# Patient Record
(demographics unavailable — no encounter records)

---

## 2024-10-15 NOTE — REASON FOR VISIT
[Spouse] : spouse [de-identified] : GIOVANNI, Rt VATS, RUL wedge rxn, RUL completion lobectomy, MLND, pneumolysis [de-identified] : 9/27/24

## 2024-10-15 NOTE — REASON FOR VISIT
[Spouse] : spouse [de-identified] : GIOVANNI, Rt VATS, RUL wedge rxn, RUL completion lobectomy, MLND, pneumolysis [de-identified] : 9/27/24

## 2024-10-15 NOTE — ASSESSMENT
[FreeTextEntry1] : Mr. DAVID NICK, 61 year old male, former smoker, w/ hx of newly dx'ed metastatic rectal cancer with rectosigmoid perforation s/p open Curry's procedure, now on chemo (start date 3/11/2023) who presented for abnormal CT Chest. Referred by Dr. Ramon.  CT Chest on 2/8/24: - 2.6 cm mixed ggo and solid nodule in the RUL containing dilated airways - stable small to moderate Lt pleural effusion with subadjacent atelectasis - resolved right pleural effusion - lesions in the right hepatic lobe has increased in size  Started chemo on 3/11/2023 every 2 weeks for 6 cycles with Dr. Mcdonald- stopped chemo after 1 cycle for Castro Bronch. Resumed 2nd cycle of chemo on 7/8/2024  MRI of the head on 3/20/2024: - A 2 mm enhancing right posterior parafalcine extra-axial lesion (16:98, 17:156).  PET on 4/17/2024: - Tunneled right chest port with tip in SVC - 3.6 x 2.2 cm stable mixed attenuation RIGHT UPPER lobe pulmonary lesion (SUV 1.2, image 94), with mild uptake - Intensely increased uptake at the RIGHT lung base without clear low-dose CT correlate (finding has SUV 6.6, image 139, SUV 6.6, image 141) placement. - There are some similar uptake is seen at the posterolateral RIGHT lung base (SUV 7.0, image 147). - Small LEFT pleural effusion with mild uptake. - centrally photopenic, peripherally hypermetabolic inferior RIGHT lobe liver mass (4.0 x 2.8 cm, SUV 10.7, image 169) - Suspected LEFT retroperitoneal node not well-defined on low-dose CT (SUV 3.8, image 208). - Small LEFT pelvic sidewall node (0.5 x 0.4 cm, SUV 6.2, image 237). - Small peritoneal nodule at anterior LEFT hemiabdomen (0.7 x 0.5 cm, SUV 2.8, image 185). - There is intense uptake at a perihepatic ascites adjacent to the inferior RIGHT lobe (SUV 13.5, image 161). - There is an additional peritoneal implant suspected at the LEFT paracolic region (SUV 8.4, image 131). - Increased uptake is seen at the anterior midline of the pelvis (SUV 34.3, image 242).  Now s/p navigational broncho with Dr. Jaquez on 6/11/24. Path right upper lung FORCEPS/CRYO BIOPSY AND ROBOTIC ASSISTED FNA revealed NON-DIAGNOSTIC. Path of Lymph node level 7, LN 11RS is negative for carcinoma, no epithelial lesion identified. Path of right upper lung BAL negative for malignant cells.  PHQ2 completed on 5/21/24.  **Discussed repeat CT chest in August and return to clinic to review results and discuss next step. Plan to proceed with IR marking RUL wedge resection, possible lobectomy if lung nodule does not decrease in size.  CT Chest on 8/13/24: - there are new subpleural patchy groundglass opacities, measuring up to 1.2 cm, in posterior segment of right upper lobe and apicoposterior segment of left upper lobe (series 4, image 173) - there is redemonstration of a 3.4 x 2.3 cm right upper lobe lesion with associated cluster of small cysts and dilated peripheral airways, similar in size, but with increased density. - there is redemonstration of reticulations and subpleural small consolidation in left lower lobe, which may represent scarring/round atelectasis - few stable micronodules are noted (for instance 2 mm micronodule in right lower lobe on series 4, image 782). There is a new branching opacity in left upper lobe (series 4, image 324), likely impacted airway. - there are multiple low-attenuation cystic lesions in the liver parenchyma with the largest on measuring up to 2.7 cm, unchanged  PFTs on 8/21/24: FVC 68%, FEV1 81%, DLCO 67%  Now s/p R.A., Rt VATS, RUL wedge rxn, RUL completion lobectomy, MLND, pneumolysis on 9/27/24. Path revealed invasive acinar AdenoCA, 2.7 cm, G2, all margins and LNs are negative, [id number], Stg IA3  CXR today---- reviewed. Pt reports well, denies SOB, cough or CP.   I have reviewed the patient's medical records and diagnostic images at time of this office consultation and have made the following recommendation: 1.Path reviewed with pt, early-stage lung cancer, RTC in 3 mons with CT Chest w/o contrast. Discussed the risk of recurrence of the disease and stressed the importance of routine CT scan surveillance, Pt verbalized understanding.   I, Dr. DELACRUZ, MEME COVARRUBIAS, personally performed the evaluation and management (E/M) services for this established patient who presents today with (a) new problem(s)/exacerbation of (an) existing condition(s).  That E/M includes conducting the examination, assessing all new/exacerbated conditions, and establishing a new plan of care.  Today, my ACP, Araceli Madera, ELISP-BC was here to observe my evaluation and management services for this new problem/exacerbated condition to be followed going forward.

## 2024-10-15 NOTE — CONSULT LETTER
[Dear  ___] : Dear  [unfilled], [Consult Letter:] : I had the pleasure of evaluating your patient, [unfilled]. [Please see my note below.] : Please see my note below. [Consult Closing:] : Thank you very much for allowing me to participate in the care of this patient.  If you have any questions, please do not hesitate to contact me. [Sincerely,] : Sincerely, [FreeTextEntry2] : Usman Ramon MD (Colon/Rec/Onc) [FreeTextEntry3] : Norma Ram MD Attending Surgeon Division of Thoracic Surgery , [facility] [employer] at [facility]

## 2024-10-15 NOTE — ASSESSMENT
[FreeTextEntry1] : Mr. DAVID NICK, 61 year old male, former smoker, w/ hx of newly dx'ed metastatic rectal cancer with rectosigmoid perforation s/p open Curry's procedure, now on chemo (start date 3/11/2023) who presented for abnormal CT Chest. Referred by Dr. Ramon.  CT Chest on 2/8/24: - 2.6 cm mixed ggo and solid nodule in the RUL containing dilated airways - stable small to moderate Lt pleural effusion with subadjacent atelectasis - resolved right pleural effusion - lesions in the right hepatic lobe has increased in size  Started chemo on 3/11/2023 every 2 weeks for 6 cycles with Dr. Mcdonald- stopped chemo after 1 cycle for Castro Bronch. Resumed 2nd cycle of chemo on 7/8/2024  MRI of the head on 3/20/2024: - A 2 mm enhancing right posterior parafalcine extra-axial lesion (16:98, 17:156).  PET on 4/17/2024: - Tunneled right chest port with tip in SVC - 3.6 x 2.2 cm stable mixed attenuation RIGHT UPPER lobe pulmonary lesion (SUV 1.2, image 94), with mild uptake - Intensely increased uptake at the RIGHT lung base without clear low-dose CT correlate (finding has SUV 6.6, image 139, SUV 6.6, image 141) placement. - There are some similar uptake is seen at the posterolateral RIGHT lung base (SUV 7.0, image 147). - Small LEFT pleural effusion with mild uptake. - centrally photopenic, peripherally hypermetabolic inferior RIGHT lobe liver mass (4.0 x 2.8 cm, SUV 10.7, image 169) - Suspected LEFT retroperitoneal node not well-defined on low-dose CT (SUV 3.8, image 208). - Small LEFT pelvic sidewall node (0.5 x 0.4 cm, SUV 6.2, image 237). - Small peritoneal nodule at anterior LEFT hemiabdomen (0.7 x 0.5 cm, SUV 2.8, image 185). - There is intense uptake at a perihepatic ascites adjacent to the inferior RIGHT lobe (SUV 13.5, image 161). - There is an additional peritoneal implant suspected at the LEFT paracolic region (SUV 8.4, image 131). - Increased uptake is seen at the anterior midline of the pelvis (SUV 34.3, image 242).  Now s/p navigational broncho with Dr. Jaquez on 6/11/24. Path right upper lung FORCEPS/CRYO BIOPSY AND ROBOTIC ASSISTED FNA revealed NON-DIAGNOSTIC. Path of Lymph node level 7, LN 11RS is negative for carcinoma, no epithelial lesion identified. Path of right upper lung BAL negative for malignant cells.  PHQ2 completed on 5/21/24.  **Discussed repeat CT chest in August and return to clinic to review results and discuss next step. Plan to proceed with IR marking RUL wedge resection, possible lobectomy if lung nodule does not decrease in size.  CT Chest on 8/13/24: - there are new subpleural patchy groundglass opacities, measuring up to 1.2 cm, in posterior segment of right upper lobe and apicoposterior segment of left upper lobe (series 4, image 173) - there is redemonstration of a 3.4 x 2.3 cm right upper lobe lesion with associated cluster of small cysts and dilated peripheral airways, similar in size, but with increased density. - there is redemonstration of reticulations and subpleural small consolidation in left lower lobe, which may represent scarring/round atelectasis - few stable micronodules are noted (for instance 2 mm micronodule in right lower lobe on series 4, image 782). There is a new branching opacity in left upper lobe (series 4, image 324), likely impacted airway. - there are multiple low-attenuation cystic lesions in the liver parenchyma with the largest on measuring up to 2.7 cm, unchanged  PFTs on 8/21/24: FVC 68%, FEV1 81%, DLCO 67%  Now s/p R.A., Rt VATS, RUL wedge rxn, RUL completion lobectomy, MLND, pneumolysis on 9/27/24. Path revealed invasive acinar AdenoCA, 2.7 cm, G2, all margins and LNs are negative, [id number], Stg IA3  CXR today---- reviewed. Pt reports well, denies SOB, cough or CP.   I have reviewed the patient's medical records and diagnostic images at time of this office consultation and have made the following recommendation: 1.Path reviewed with pt, early-stage lung cancer, RTC in 3 mons with CT Chest w/o contrast. Discussed the risk of recurrence of the disease and stressed the importance of routine CT scan surveillance, Pt verbalized understanding.   I, Dr. DELACRUZ, MEME COVARRUBIAS, personally performed the evaluation and management (E/M) services for this established patient who presents today with (a) new problem(s)/exacerbation of (an) existing condition(s).  That E/M includes conducting the examination, assessing all new/exacerbated conditions, and establishing a new plan of care.  Today, my ACP, Araceli Madera, ELISP-BC was here to observe my evaluation and management services for this new problem/exacerbated condition to be followed going forward.

## 2024-10-15 NOTE — CONSULT LETTER
[Dear  ___] : Dear  [unfilled], [Consult Letter:] : I had the pleasure of evaluating your patient, [unfilled]. [Please see my note below.] : Please see my note below. [Consult Closing:] : Thank you very much for allowing me to participate in the care of this patient.  If you have any questions, please do not hesitate to contact me. [Sincerely,] : Sincerely, [FreeTextEntry2] : Usman Ramon MD (Colon/Rec/Onc) [FreeTextEntry3] : Norma Ram MD Attending Surgeon Division of Thoracic Surgery , [facility] [employer] at [facility]

## 2024-10-22 NOTE — CONSULT LETTER
[Dear  ___] : Dear  [unfilled], [Courtesy Letter:] : I had the pleasure of seeing your patient, [unfilled], in my office today. [Please see my note below.] : Please see my note below. [Consult Closing:] : Thank you very much for allowing me to participate in the care of this patient.  If you have any questions, please do not hesitate to contact me. [Sincerely,] : Sincerely, [DrGasper  ___] : Dr. CATHERINE [DrGasper ___] : Dr. CATHERINE [FreeTextEntry3] : Yuan Dent MD, RYAN, FACS Director of Surgical Oncology- [facility] , Department of Surgery [facility] at [place] [id number] Ph: [phone] Cell: [phone]

## 2024-10-22 NOTE — ASSESSMENT
[FreeTextEntry1] : Mr. 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 Final pathology [id number] LNs 1/17 positive LNs, negative PNI, positive LVI, negative margins. BRAF and MMR intact.   s/p Rt VATS, RUL wedge rxn, RUL completion lobectomy, MLND, pneumolysis 09/27/2024, Path-Adenocarcinoma, acinar type predominant. Last Avastin 8/19/2024 with Dr. Mcdonald.   PLAN:  - MRI MRCP= liver protocol with contrast to define the tumor burden in the liver to consider potential liver resection vs liver directed therapy. - PET scan will be ordered by oncologist Dr. Martins - RTO 2 weeks.   I have discussed the diagnosis, therapeutic plan and options with the patient at length. Patient expressed verbal understanding to proceed with proposed plan. All questions answered.

## 2024-10-22 NOTE — PHYSICAL EXAM
[Normal] : supple, no neck mass and thyroid not enlarged [Normal Neck Lymph Nodes] : normal neck lymph nodes  [Normal Supraclavicular Lymph Nodes] : normal supraclavicular lymph nodes [Normal Groin Lymph Nodes] : normal groin lymph nodes [Normal Axillary Lymph Nodes] : normal axillary lymph nodes [Normal] : oriented to person, place and time, with appropriate affect [FreeTextEntry1] :  COVID -19 precautions as per [facility] policy was universally followed. [de-identified] : Abdomen soft, non-tender, non-distended, and no palpable masses.

## 2024-10-22 NOTE — PHYSICAL EXAM
[Normal] : supple, no neck mass and thyroid not enlarged [Normal Neck Lymph Nodes] : normal neck lymph nodes  [Normal Supraclavicular Lymph Nodes] : normal supraclavicular lymph nodes [Normal Groin Lymph Nodes] : normal groin lymph nodes [Normal Axillary Lymph Nodes] : normal axillary lymph nodes [Normal] : oriented to person, place and time, with appropriate affect [FreeTextEntry1] :  COVID -19 precautions as per [facility] policy was universally followed. [de-identified] : Abdomen soft, non-tender, non-distended, and no palpable masses.

## 2024-10-22 NOTE — HISTORY OF PRESENT ILLNESS
[de-identified] : Mr. 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 Final pathology [id number] LNs  positive LNs, negative PNI, positive LVI, negative margins. BRAF and MMR intact. Ostomy function intact. Currently receiving chemo 3/6 cycle.  Referred by Dr. Ramon   3/29/24 CTA LIVER: Ill-defined hypoattenuating lesion measuring 3.8 x 2.3 cm in the right lobe and a more posterior lesion along the surface of the right hepatic lobe measuring approximately 4.1 x 1.1 cm.  24: Patient reports feeling well today. Denies fever, chills, N/V/D, unintentional weight loss. Ostomy intact. Has hard stools but on stool softener now. Currently on 3/6 cycle (3month course) chemo. Reports IR drain was removed ~3weeks ago.   PET/CT  Multiple foci of intensely increased uptake at the lung bases without clear low-dose CT correlate, suspicious for malignant involvement. Suspected LEFT retroperitoneal and LEFT pelvic sidewall nodes, peritoneal nodule LEFT hemiabdomen, and suspected peritoneal implant LEFT paracolic region, suspicious for metastatic disease. Small LEFT pleural effusion with mild uptake, indeterminate for malignancy.  24: Patient reports hold on chemo to complete Lung bx. Due to restart chemotherapy in July. No new complaints. Denies fever, chills, N/V/D, unintentional weight loss.  s/p Rt VATS, RUL wedge rxn, RUL completion lobectomy, MLND, pneumolysis 2024, Path-Adenocarcinoma, acinar type predominant. Last Avastin 2024 with Dr. Mcdonald. 10/22/2024- Pt reports feeling well. Tolerating diet, has gain a couple of pounds. Denies nausea, vomiting, diarrhea, hematochezia or steatorrhea, and unintentional weight loss at this time. Ostomy site is pink and functioning with brown stool. Wife concerned patient has been having 2 beers daily.   PMHX: none PSH: Curry's 2023 Social: Quit all substance usage but prior to sx in : 1 pack cigs/day; bottle of wine per day, no illicit drug use Med: Senna FHx: unsure   ECO  Unemployed   PCP: in search for one  Med/onc: Dr. Martins Thorac: Dr. Ram - lung lesion

## 2024-10-22 NOTE — REASON FOR VISIT
[Follow-Up Visit] : a follow-up visit for [FreeTextEntry2] : Liver Mass + Ostomy resection [Rectal Cancer] : rectal cancer

## 2024-10-22 NOTE — CONSULT LETTER
[Dear  ___] : Dear  [unfilled], [Courtesy Letter:] : I had the pleasure of seeing your patient, [unfilled], in my office today. [Please see my note below.] : Please see my note below. [Consult Closing:] : Thank you very much for allowing me to participate in the care of this patient.  If you have any questions, please do not hesitate to contact me. [Sincerely,] : Sincerely, [DrGasper  ___] : Dr. CATHERINE [DrGasper ___] : Dr. CATHERINE [FreeTextEntry3] : Yuan Dent MD, RYAN, FACS Director of Surgical Oncology- [facility] , Department of Surgery [facility] at [place] [id number] Ph: [phone] Cell: [phone]

## 2024-10-22 NOTE — HISTORY OF PRESENT ILLNESS
[de-identified] : Mr. 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 Final pathology [id number] LNs  positive LNs, negative PNI, positive LVI, negative margins. BRAF and MMR intact. Ostomy function intact. Currently receiving chemo 3/6 cycle.  Referred by Dr. Ramon   3/29/24 CTA LIVER: Ill-defined hypoattenuating lesion measuring 3.8 x 2.3 cm in the right lobe and a more posterior lesion along the surface of the right hepatic lobe measuring approximately 4.1 x 1.1 cm.  24: Patient reports feeling well today. Denies fever, chills, N/V/D, unintentional weight loss. Ostomy intact. Has hard stools but on stool softener now. Currently on 3/6 cycle (3month course) chemo. Reports IR drain was removed ~3weeks ago.   PET/CT  Multiple foci of intensely increased uptake at the lung bases without clear low-dose CT correlate, suspicious for malignant involvement. Suspected LEFT retroperitoneal and LEFT pelvic sidewall nodes, peritoneal nodule LEFT hemiabdomen, and suspected peritoneal implant LEFT paracolic region, suspicious for metastatic disease. Small LEFT pleural effusion with mild uptake, indeterminate for malignancy.  24: Patient reports hold on chemo to complete Lung bx. Due to restart chemotherapy in July. No new complaints. Denies fever, chills, N/V/D, unintentional weight loss.  s/p Rt VATS, RUL wedge rxn, RUL completion lobectomy, MLND, pneumolysis 2024, Path-Adenocarcinoma, acinar type predominant. Last Avastin 2024 with Dr. Mcdonald. 10/22/2024- Pt reports feeling well. Tolerating diet, has gain a couple of pounds. Denies nausea, vomiting, diarrhea, hematochezia or steatorrhea, and unintentional weight loss at this time. Ostomy site is pink and functioning with brown stool. Wife concerned patient has been having 2 beers daily.   PMHX: none PSH: Curry's 2023 Social: Quit all substance usage but prior to sx in : 1 pack cigs/day; bottle of wine per day, no illicit drug use Med: Senna FHx: unsure   [employer]  Unemployed   PCP: in search for one  Med/onc: Dr. Martins Thorac: Dr. Ram - lung lesion

## 2024-10-22 NOTE — ASSESSMENT
[FreeTextEntry1] : Mr. 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 Final pathology [id number] LNs 1/17 positive LNs, negative PNI, positive LVI, negative margins. BRAF and MMR intact.   s/p Rt VATS, RUL wedge rxn, RUL completion lobectomy, MLND, pneumolysis 09/27/2024, Path-Adenocarcinoma, acinar type predominant. Last Avastin 8/19/2024 with Dr. Mcdonald.   PLAN:  - MRI MRCP= liver protocol with contrast to define the tumor burden in the liver to consider potential liver resection vs liver directed therapy. - PET scan will be ordered by oncologist Dr. Martins - RTO 2 weeks.   I have discussed the diagnosis, therapeutic plan and options with the patient at length. Patient expressed verbal understanding to proceed with proposed plan. All questions answered.

## 2024-11-06 NOTE — CONSULT LETTER
[Dear  ___] : Dear  [unfilled], [Courtesy Letter:] : I had the pleasure of seeing your patient, [unfilled], in my office today. [Please see my note below.] : Please see my note below. [Consult Closing:] : Thank you very much for allowing me to participate in the care of this patient.  If you have any questions, please do not hesitate to contact me. [Sincerely,] : Sincerely, [DrGasper  ___] : Dr. CATHERINE [DrGasper ___] : Dr. CATHERINE [FreeTextEntry3] : Yuan Dent MD, RYAN, FACS Director of Surgical Oncology- [facility] , Department of Surgery [facility] at [place] [id number] Ph: [phone] Cell: [phone]

## 2024-11-06 NOTE — HISTORY OF PRESENT ILLNESS
[de-identified] : Mr. 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 Final pathology [id number] LNs  positive LNs, negative PNI, positive LVI, negative margins. BRAF and MMR intact. Ostomy function intact. Currently receiving chemo 3/6 cycle.  Referred by Dr. Ramon   3/29/24 CTA LIVER: Ill-defined hypoattenuating lesion measuring 3.8 x 2.3 cm in the right lobe and a more posterior lesion along the surface of the right hepatic lobe measuring approximately 4.1 x 1.1 cm.  24: Patient reports feeling well today. Denies fever, chills, N/V/D, unintentional weight loss. Ostomy intact. Has hard stools but on stool softener now. Currently on 3/6 cycle (3month course) chemo. Reports IR drain was removed ~3weeks ago.   PET/CT  Multiple foci of intensely increased uptake at the lung bases without clear low-dose CT correlate, suspicious for malignant involvement. Suspected LEFT retroperitoneal and LEFT pelvic sidewall nodes, peritoneal nodule LEFT hemiabdomen, and suspected peritoneal implant LEFT paracolic region, suspicious for metastatic disease. Small LEFT pleural effusion with mild uptake, indeterminate for malignancy.  24: Patient reports hold on chemo to complete Lung bx. Due to restart chemotherapy in July. No new complaints. Denies fever, chills, N/V/D, unintentional weight loss.  s/p Rt VATS, RUL wedge rxn, RUL completion lobectomy, MLND, pneumolysis 2024, Path-Adenocarcinoma, acinar type predominant. Last Avastin 2024 with Dr. Mcdonald. 10/22/2024- Pt reports feeling well. Tolerating diet, has gain a couple of pounds. Denies nausea, vomiting, diarrhea, hematochezia or steatorrhea, and unintentional weight loss at this time. Ostomy site is pink and functioning with brown stool. Wife concerned patient has been having 2 beers daily.   10/30/2024 MR Abd- 1. There is a 3.8 cm metastasis in segment VI of the liver with probable extracapsular extension. There are a few additional subcapsular liver masses that probably represent metastases. 2. Interval right upper lobectomy. Signal abnormality and abnormal enhancement anterolateral aspect of right lower chest wall could represent postoperative change, but correlation with surgical approach recommended in order to rule out chest wall metastases. 3. There are new nodular opacities in both lungs.   2024 PET-PET-2024  1. Non-avid postsurgical changes in the right upper lobe. New ground glass opacities in both lung fields as detailed above with mild activity since most recent CT in 2024. These may represent inflammatory process; however, malignant process is not excluded. Follow-up CT chest in 2-4 weeks recommended.  2. Smaller extent of increased activity in the posterior sulcus of the right lower lobe and less extensive pleuro parenchymal and atelectatic changes in the left lower lobe with improvement in extent and FDG activity. These findings suggest partial response to recent systemic therapy.  3. FDG avid hypodense lesion in hepatic segment 6 with photopenic necrotic component and peripheral rind of intense activity compatible with persistent metastatic process; likely improvement of other subcapsular lesions in segment 7 and segment 6/7. Findings may reflect partial response to therapy.  4. Interval resolution of foci of increased activity in the left anterior abdominal wall, left paracolic gutter, left retroperitoneal and left pelvic sidewall.   2024- Pt reports feeling well, has gained 1 lbs. Stoma site looks viable with formed stool. Denies etoh use in the past month.   PMHX: none PSH: Curry's 2023 Social: Quit all substance usage but prior to sx in : 1 pack cigs/day; bottle of wine per day, no illicit drug use Med: Senna FHx: unsure   [employer]  Unemployed   PCP: in search for one  Med/onc: Dr. Martins Thorac: Dr. Ram - lung lesion

## 2024-11-06 NOTE — PHYSICAL EXAM
[Normal] : supple, no neck mass and thyroid not enlarged [Normal Neck Lymph Nodes] : normal neck lymph nodes  [Normal Supraclavicular Lymph Nodes] : normal supraclavicular lymph nodes [Normal Groin Lymph Nodes] : normal groin lymph nodes [Normal Axillary Lymph Nodes] : normal axillary lymph nodes [Normal] : oriented to person, place and time, with appropriate affect [FreeTextEntry1] :  COVID -19 precautions as per [facility] policy was universally followed. [de-identified] : Abdomen soft, non-tender, non-distended, and no palpable masses.

## 2024-11-06 NOTE — ASSESSMENT
[FreeTextEntry1] : Mr. 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 Final pathology [id number] LNs 1/17 positive LNs, negative PNI, positive LVI, negative margins. BRAF and MMR intact.   s/p Rt VATS, RUL wedge rxn, RUL completion lobectomy, MLND, pneumolysis 09/27/2024, Path-Adenocarcinoma, acinar type predominant. Last Avastin 8/19/2024 with Dr. Mcdonald.  10/30/2024 MR Abd- 1. There is a 3.8 cm metastasis in segment VI of the liver with probable extracapsular extension. There are a few additional subcapsular liver masses that probably represent metastases. 2. Interval right upper lobectomy. Signal abnormality and abnormal enhancement anterolateral aspect of right lower chest wall could represent postoperative change, but correlation with surgical approach recommended in order to rule out chest wall metastases. 3. There are new nodular opacities in both lungs.   11/01/2024 PET-PET-11/01/2024  1. Non-avid postsurgical changes in the right upper lobe. New ground glass opacities in both lung fields as detailed above with mild activity since most recent CT in 8/2024. These may represent inflammatory process; however, malignant process is not excluded. Follow-up CT chest in 2-4 weeks recommended.  2. Smaller extent of increased activity in the posterior sulcus of the right lower lobe and less extensive pleuro parenchymal and atelectatic changes in the left lower lobe with improvement in extent and FDG activity. These findings suggest partial response to recent systemic therapy.  3. FDG avid hypodense lesion in hepatic segment 6 with photopenic necrotic component and peripheral rind of intense activity compatible with persistent metastatic process; likely improvement of other subcapsular lesions in segment 7 and segment 6/7. Findings may reflect partial response to therapy.  4. Interval resolution of foci of increased activity in the left anterior abdominal wall, left paracolic gutter, left retroperitoneal and left pelvic sidewall.   PLAN:  -[facility] Consensus repeat CT chest in 2-4 weeks to r/o findings in PET, will be order by medical oncology. -Restart chemotherapy with Dr. Mcdonald as patient has responded CEA has decreased and PET has demonstrated tumor response despite interruptions of chemo.   I have discussed the diagnosis, therapeutic plan and options with the patient at length. Patient expressed verbal understanding to proceed with proposed plan. All questions answered.

## 2024-11-06 NOTE — CONSULT LETTER
[Dear  ___] : Dear  [unfilled], [Courtesy Letter:] : I had the pleasure of seeing your patient, [unfilled], in my office today. [Please see my note below.] : Please see my note below. [Consult Closing:] : Thank you very much for allowing me to participate in the care of this patient.  If you have any questions, please do not hesitate to contact me. [Sincerely,] : Sincerely, [DrGasper  ___] : Dr. CATHERINE [DrGasper ___] : Dr. CATHERINE [FreeTextEntry3] : Yuan Dent MD, RYAN, FACS Director of Surgical Oncology- [facility] , Department of Surgery [facility] at [place] [id number] Ph: [phone] Cell: [phone]

## 2024-11-06 NOTE — HISTORY OF PRESENT ILLNESS
[de-identified] : Mr. 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 Final pathology [id number] LNs  positive LNs, negative PNI, positive LVI, negative margins. BRAF and MMR intact. Ostomy function intact. Currently receiving chemo 3/6 cycle.  Referred by Dr. Ramon   3/29/24 CTA LIVER: Ill-defined hypoattenuating lesion measuring 3.8 x 2.3 cm in the right lobe and a more posterior lesion along the surface of the right hepatic lobe measuring approximately 4.1 x 1.1 cm.  24: Patient reports feeling well today. Denies fever, chills, N/V/D, unintentional weight loss. Ostomy intact. Has hard stools but on stool softener now. Currently on 3/6 cycle (3month course) chemo. Reports IR drain was removed ~3weeks ago.   PET/CT  Multiple foci of intensely increased uptake at the lung bases without clear low-dose CT correlate, suspicious for malignant involvement. Suspected LEFT retroperitoneal and LEFT pelvic sidewall nodes, peritoneal nodule LEFT hemiabdomen, and suspected peritoneal implant LEFT paracolic region, suspicious for metastatic disease. Small LEFT pleural effusion with mild uptake, indeterminate for malignancy.  24: Patient reports hold on chemo to complete Lung bx. Due to restart chemotherapy in July. No new complaints. Denies fever, chills, N/V/D, unintentional weight loss.  s/p Rt VATS, RUL wedge rxn, RUL completion lobectomy, MLND, pneumolysis 2024, Path-Adenocarcinoma, acinar type predominant. Last Avastin 2024 with Dr. Mcdonald. 10/22/2024- Pt reports feeling well. Tolerating diet, has gain a couple of pounds. Denies nausea, vomiting, diarrhea, hematochezia or steatorrhea, and unintentional weight loss at this time. Ostomy site is pink and functioning with brown stool. Wife concerned patient has been having 2 beers daily.   10/30/2024 MR Abd- 1. There is a 3.8 cm metastasis in segment VI of the liver with probable extracapsular extension. There are a few additional subcapsular liver masses that probably represent metastases. 2. Interval right upper lobectomy. Signal abnormality and abnormal enhancement anterolateral aspect of right lower chest wall could represent postoperative change, but correlation with surgical approach recommended in order to rule out chest wall metastases. 3. There are new nodular opacities in both lungs.   2024 PET-PET-2024  1. Non-avid postsurgical changes in the right upper lobe. New ground glass opacities in both lung fields as detailed above with mild activity since most recent CT in 2024. These may represent inflammatory process; however, malignant process is not excluded. Follow-up CT chest in 2-4 weeks recommended.  2. Smaller extent of increased activity in the posterior sulcus of the right lower lobe and less extensive pleuro parenchymal and atelectatic changes in the left lower lobe with improvement in extent and FDG activity. These findings suggest partial response to recent systemic therapy.  3. FDG avid hypodense lesion in hepatic segment 6 with photopenic necrotic component and peripheral rind of intense activity compatible with persistent metastatic process; likely improvement of other subcapsular lesions in segment 7 and segment 6/7. Findings may reflect partial response to therapy.  4. Interval resolution of foci of increased activity in the left anterior abdominal wall, left paracolic gutter, left retroperitoneal and left pelvic sidewall.   2024- Pt reports feeling well, has gained 1 lbs. Stoma site looks viable with formed stool. Denies etoh use in the past month.   PMHX: none PSH: Curry's 2023 Social: Quit all substance usage but prior to sx in : 1 pack cigs/day; bottle of wine per day, no illicit drug use Med: Senna FHx: unsure   [employer]  Unemployed   PCP: in search for one  Med/onc: Dr. Martins Thorac: Dr. Ram - lung lesion

## 2024-11-06 NOTE — PHYSICAL EXAM
[Normal] : supple, no neck mass and thyroid not enlarged [Normal Neck Lymph Nodes] : normal neck lymph nodes  [Normal Supraclavicular Lymph Nodes] : normal supraclavicular lymph nodes [Normal Groin Lymph Nodes] : normal groin lymph nodes [Normal Axillary Lymph Nodes] : normal axillary lymph nodes [Normal] : oriented to person, place and time, with appropriate affect [FreeTextEntry1] :  COVID -19 precautions as per [facility] policy was universally followed. [de-identified] : Abdomen soft, non-tender, non-distended, and no palpable masses.

## 2024-11-06 NOTE — ASSESSMENT
[FreeTextEntry1] : Mr. 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 Final pathology [id number] LNs 1/17 positive LNs, negative PNI, positive LVI, negative margins. BRAF and MMR intact.   s/p Rt VATS, RUL wedge rxn, RUL completion lobectomy, MLND, pneumolysis 09/27/2024, Path-Adenocarcinoma, acinar type predominant. Last Avastin 8/19/2024 with Dr. Mcdonald.  10/30/2024 MR Abd- 1. There is a 3.8 cm metastasis in segment VI of the liver with probable extracapsular extension. There are a few additional subcapsular liver masses that probably represent metastases. 2. Interval right upper lobectomy. Signal abnormality and abnormal enhancement anterolateral aspect of right lower chest wall could represent postoperative change, but correlation with surgical approach recommended in order to rule out chest wall metastases. 3. There are new nodular opacities in both lungs.   11/01/2024 PET-PET-11/01/2024  1. Non-avid postsurgical changes in the right upper lobe. New ground glass opacities in both lung fields as detailed above with mild activity since most recent CT in 8/2024. These may represent inflammatory process; however, malignant process is not excluded. Follow-up CT chest in 2-4 weeks recommended.  2. Smaller extent of increased activity in the posterior sulcus of the right lower lobe and less extensive pleuro parenchymal and atelectatic changes in the left lower lobe with improvement in extent and FDG activity. These findings suggest partial response to recent systemic therapy.  3. FDG avid hypodense lesion in hepatic segment 6 with photopenic necrotic component and peripheral rind of intense activity compatible with persistent metastatic process; likely improvement of other subcapsular lesions in segment 7 and segment 6/7. Findings may reflect partial response to therapy.  4. Interval resolution of foci of increased activity in the left anterior abdominal wall, left paracolic gutter, left retroperitoneal and left pelvic sidewall.   PLAN:  -[facility] Consensus repeat CT chest in 2-4 weeks to r/o findings in PET, will be order by medical oncology. -Restart chemotherapy with Dr. Mcdonald as patient has responded CEA has decreased and PET has demonstrated tumor response despite interruptions of chemo.   I have discussed the diagnosis, therapeutic plan and options with the patient at length. Patient expressed verbal understanding to proceed with proposed plan. All questions answered.

## 2024-11-27 NOTE — HISTORY OF PRESENT ILLNESS
[de-identified] : 61 year old male, former smoker, w/ hx of newly dx'ed metastatic rectal cancer with rectosigmoid perforation s/p open Curry's procedure, now on chemotherapy presenting for a follow up visit. BP noted to be elevated today. Pt states it is also elevated at home. He feels well overall and denies low appetite, fatigue, SOB, or CP. He is compliant with his BP medication.

## 2024-11-27 NOTE — HEALTH RISK ASSESSMENT
[Intercurrent hospitalizations] : was admitted to the hospital  [de-identified] : 09/27 anna [de-identified] : Oncologist [de-identified] : pt is nto active [de-identified] : healthy

## 2024-12-11 NOTE — HEALTH RISK ASSESSMENT
[No] : No [No falls in past year] : Patient reported no falls in the past year [de-identified] : No [de-identified] : No [de-identified] : No [de-identified] : No

## 2024-12-11 NOTE — HISTORY OF PRESENT ILLNESS
[FreeTextEntry1] : Pt is here to follow up for B/P after changing dose on medication. [de-identified] : 61 M former smoker with history of newly diagnosed metastatic rectal cancer with rectosigmoid perforation s/p open Curry's procedure now on chemotherapy presenting for a follow up visit. Home BP logs have been reviewed with the patient. Normal BP logs. Patient last went for chemotherapy on 12/3/2024. He feels well.

## 2025-01-02 NOTE — ASSESSMENT
[FreeTextEntry1] : Mr. DAVID NICK, 61 year old male, former smoker, w/ hx of newly dx'ed metastatic rectal cancer with rectosigmoid perforation s/p open Curry's procedure, now on chemo (start date 3/11/2023) who presented for abnormal CT Chest. Referred by Dr. Ramon.  Now s/p R.A., Rt VATS, RUL wedge rxn, RUL completion lobectomy, MLND, pneumolysis on 9/27/24. Path revealed invasive acinar AdenoCA, 2.7 cm, G2, all margins and LNs are negative, [id number], Stg IA3  Presents today for follow up with imaging.   I have reviewed the patient's medical records and diagnostic images at time of this office consultation and have made the following recommendation: 1.  Recommendations reviewed with patient during this office visit, and all questions answered; Patient instructed on the importance of follow up and verbalizes understanding.

## 2025-01-02 NOTE — CONSULT LETTER
[Dear  ___] : Dear  [unfilled], [Consult Letter:] : I had the pleasure of evaluating your patient, [unfilled]. [Please see my note below.] : Please see my note below. [Consult Closing:] : Thank you very much for allowing me to participate in the care of this patient.  If you have any questions, please do not hesitate to contact me. [Sincerely,] : Sincerely, [FreeTextEntry2] : Usman Ramon MD (Colon/Rec/Onc) [FreeTextEntry3] : Norma Ram MD Attending Surgeon Division of Thoracic Surgery , [facility] [employer] at [facility]

## 2025-01-02 NOTE — HISTORY OF PRESENT ILLNESS
[FreeTextEntry1] : Mr. DAVID NICK, 61 year old male, former smoker, w/ hx of newly dx'ed metastatic rectal cancer with rectosigmoid perforation s/p open Curry's procedure, now on chemo (start date 3/11/2023) who presented for abnormal CT Chest. Referred by Dr. Ramon.  CT Chest on 2/8/24: - 2.6 cm mixed ggo and solid nodule in the RUL containing dilated airways - stable small to moderate Lt pleural effusion with subadjacent atelectasis - resolved right pleural effusion - lesions in the right hepatic lobe has increased in size  Started chemo on 3/11/2023 every 2 weeks for 6 cycles with Dr. Mcdonald- stopped chemo after 1 cycle for Castro Bronch. Resumed 2nd cycle of chemo on 7/8/2024  MRI of the head on 3/20/2024: - A 2 mm enhancing right posterior parafalcine extra-axial lesion (16:98, 17:156).  PET on 4/17/2024: - Tunneled right chest port with tip in SVC - 3.6 x 2.2 cm stable mixed attenuation RIGHT UPPER lobe pulmonary lesion (SUV 1.2, image 94), with mild uptake - Intensely increased uptake at the RIGHT lung base without clear low-dose CT correlate (finding has SUV 6.6, image 139, SUV 6.6, image 141) placement. - There are some similar uptake is seen at the posterolateral RIGHT lung base (SUV 7.0, image 147). - Small LEFT pleural effusion with mild uptake. - centrally photopenic, peripherally hypermetabolic inferior RIGHT lobe liver mass (4.0 x 2.8 cm, SUV 10.7, image 169) - Suspected LEFT retroperitoneal node not well-defined on low-dose CT (SUV 3.8, image 208). - Small LEFT pelvic sidewall node (0.5 x 0.4 cm, SUV 6.2, image 237). - Small peritoneal nodule at anterior LEFT hemiabdomen (0.7 x 0.5 cm, SUV 2.8, image 185). - There is intense uptake at a perihepatic ascites adjacent to the inferior RIGHT lobe (SUV 13.5, image 161). - There is an additional peritoneal implant suspected at the LEFT paracolic region (SUV 8.4, image 131). - Increased uptake is seen at the anterior midline of the pelvis (SUV 34.3, image 242).  Now s/p navigational broncho with Dr. Jaquez on 6/11/24. Path right upper lung FORCEPS/CRYO BIOPSY AND ROBOTIC ASSISTED FNA revealed NON-DIAGNOSTIC. Path of Lymph node level 7, LN 11RS is negative for carcinoma, no epithelial lesion identified. Path of right upper lung BAL negative for malignant cells.  CT Chest on 8/13/24: - there are new subpleural patchy groundglass opacities, measuring up to 1.2 cm, in posterior segment of right upper lobe and apicoposterior segment of left upper lobe (series 4, image 173) - there is redemonstration of a 3.4 x 2.3 cm right upper lobe lesion with associated cluster of small cysts and dilated peripheral airways, similar in size, but with increased density. - there is redemonstration of reticulations and subpleural small consolidation in left lower lobe, which may represent scarring/round atelectasis - few stable micronodules are noted (for instance 2 mm micronodule in right lower lobe on series 4, image 782). There is a new branching opacity in left upper lobe (series 4, image 324), likely impacted airway. - there are multiple low-attenuation cystic lesions in the liver parenchyma with the largest on measuring up to 2.7 cm, unchanged  PFTs on 8/21/24: FVC 68%, FEV1 81%, DLCO 67%  Now s/p R.A., Rt VATS, RUL wedge rxn, RUL completion lobectomy, MLND, pneumolysis on 9/27/24. Path revealed invasive acinar AdenoCA, 2.7 cm, G2, all margins and LNs are negative, [id number], Stg IA3.  CT Chest on 12/5/24: - Again post right upper lobectomy. New consolidation with "atoll sign" in superior segment left lower lobe (3/54).  - There are several additional peripherally located groundglass nodular opacities in both lungs. Rounded atelectasis present left lower lobe. - No change small, partially loculated right pleural effusion.  - No change mild left pleural thickening.  - Multiple low-density liver lesions again present, some shown to be cysts and/or biliary hamartomas on prior studies. A more solid appearing mass in segment VI is partially visualized and was previously shown to be a metastasis.   He presents today for 3 months follow up.

## 2025-01-14 NOTE — HISTORY OF PRESENT ILLNESS
[FreeTextEntry1] : Mr. DAVID NICK, 61 year old male, former smoker, w/ hx of newly dx'ed metastatic rectal cancer with rectosigmoid perforation s/p open Curry's procedure, now on chemo (start date 3/11/2023) who presented for abnormal CT Chest. Referred by Dr. Ramon.  CT Chest on 2/8/24: - 2.6 cm mixed ggo and solid nodule in the RUL containing dilated airways - stable small to moderate Lt pleural effusion with subadjacent atelectasis - resolved right pleural effusion - lesions in the right hepatic lobe has increased in size  Started chemo on 3/11/2023 every 2 weeks for 6 cycles with Dr. Mcdonald- stopped chemo after 1 cycle for Castro Bronch. Resumed 2nd cycle of chemo on 7/8/2024  MRI of the head on 3/20/2024: - A 2 mm enhancing right posterior parafalcine extra-axial lesion (16:98, 17:156).  PET on 4/17/2024: - Tunneled right chest port with tip in SVC - 3.6 x 2.2 cm stable mixed attenuation RIGHT UPPER lobe pulmonary lesion (SUV 1.2, image 94), with mild uptake - Intensely increased uptake at the RIGHT lung base without clear low-dose CT correlate (finding has SUV 6.6, image 139, SUV 6.6, image 141) placement. - There are some similar uptake is seen at the posterolateral RIGHT lung base (SUV 7.0, image 147). - Small LEFT pleural effusion with mild uptake. - centrally photopenic, peripherally hypermetabolic inferior RIGHT lobe liver mass (4.0 x 2.8 cm, SUV 10.7, image 169) - Suspected LEFT retroperitoneal node not well-defined on low-dose CT (SUV 3.8, image 208). - Small LEFT pelvic sidewall node (0.5 x 0.4 cm, SUV 6.2, image 237). - Small peritoneal nodule at anterior LEFT hemiabdomen (0.7 x 0.5 cm, SUV 2.8, image 185). - There is intense uptake at a perihepatic ascites adjacent to the inferior RIGHT lobe (SUV 13.5, image 161). - There is an additional peritoneal implant suspected at the LEFT paracolic region (SUV 8.4, image 131). - Increased uptake is seen at the anterior midline of the pelvis (SUV 34.3, image 242).  Now s/p navigational broncho with Dr. Jaquez on 6/11/24. Path right upper lung FORCEPS/CRYO BIOPSY AND ROBOTIC ASSISTED FNA revealed NON-DIAGNOSTIC. Path of Lymph node level 7, LN 11RS is negative for carcinoma, no epithelial lesion identified. Path of right upper lung BAL negative for malignant cells.  CT Chest on 8/13/24: - there are new subpleural patchy groundglass opacities, measuring up to 1.2 cm, in posterior segment of right upper lobe and apicoposterior segment of left upper lobe (series 4, image 173) - there is redemonstration of a 3.4 x 2.3 cm right upper lobe lesion with associated cluster of small cysts and dilated peripheral airways, similar in size, but with increased density. - there is redemonstration of reticulations and subpleural small consolidation in left lower lobe, which may represent scarring/round atelectasis - few stable micronodules are noted (for instance 2 mm micronodule in right lower lobe on series 4, image 782). There is a new branching opacity in left upper lobe (series 4, image 324), likely impacted airway. - there are multiple low-attenuation cystic lesions in the liver parenchyma with the largest on measuring up to 2.7 cm, unchanged  PFTs on 8/21/24: FVC 68%, FEV1 81%, DLCO 67%  Now 3 mons s/p R.A., Rt VATS, RUL wedge rxn, RUL completion lobectomy, MLND, pneumolysis on 9/27/24. 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  CT Chest on 12/5/24: - Again post right upper lobectomy. New consolidation with "atoll sign" in superior segment left lower lobe (3/54).  - There are several additional peripherally located groundglass nodular opacities in both lungs. Rounded atelectasis present left lower lobe. - No change small, partially loculated right pleural effusion.  - No change mild left pleural thickening.  - Multiple low-density liver lesions again present, some shown to be cysts and/or biliary hamartomas on prior studies. A more solid appearing mass in segment VI is partially visualized and was previously shown to be a metastasis.  He presents today for 3 months follow up.  Pt reports doing well, denies SOB, cough or CP. Pt is back on chemotherapy with Dr. Mcdonald.

## 2025-01-14 NOTE — ASSESSMENT
[FreeTextEntry1] : Mr. DAVID NICK, 61 year old male, former smoker, w/ hx of newly dx'ed metastatic rectal cancer with rectosigmoid perforation s/p open Curry's procedure, now on chemo (start date 3/11/2023) who presented for abnormal CT Chest. Referred by Dr. Ramon.  Now s/p R.A., Rt VATS, RUL wedge rxn, RUL completion lobectomy, MLND, pneumolysis on 9/27/24. Path revealed invasive acinar AdenoCA, 2.7 cm, G2, all margins and LNs are negative, [id number], Stg IA3  Presents today for follow up with imaging.   I have reviewed the patient's medical records and diagnostic images at time of this office consultation and have made the following recommendation: 1. CT reviewed with pt, Lt lung consolidation, likely inflammatory etiology, will continue surveillance. RTC in 3 mons with CT Chest w/o contrast   [facility] performed the evaluation and management (E/M) services for this established patient who presents today with (a) new problem(s)/exacerbation of (an) existing condition(s).  That E/M includes conducting the examination, assessing all new/exacerbated conditions, and establishing a new plan of care.  Today, my ACP, PATRICIA Grimes was here to observe my evaluation and management services for this new problem/exacerbated condition to be followed going forward.

## 2025-01-14 NOTE — ASSESSMENT
[FreeTextEntry1] : Mr. DAVID NICK, 61 year old male, former smoker, w/ hx of newly dx'ed metastatic rectal cancer with rectosigmoid perforation s/p open Curry's procedure, now on chemo (start date 3/11/2023) who presented for abnormal CT Chest. Referred by Dr. Ramon.  Now s/p R.A., Rt VATS, RUL wedge rxn, RUL completion lobectomy, MLND, pneumolysis on 9/27/24. Path revealed invasive acinar AdenoCA, 2.7 cm, G2, all margins and LNs are negative, [id number], Stg IA3  Presents today for follow up with imaging.   I have reviewed the patient's medical records and diagnostic images at time of this office consultation and have made the following recommendation: 1. CT reviewed with pt, Lt lung consolidation, likely inflammatory etiology, will continue surveillance. RTC in 3 mons with CT Chest w/o contrast   [facility] performed the evaluation and management (E/M) services for this established patient who presents today with (a) new problem(s)/exacerbation of (an) existing condition(s).  That E/M includes conducting the examination, assessing all new/exacerbated conditions, and establishing a new plan of care.  Today, my ACP, PATRICIA Griems was here to observe my evaluation and management services for this new problem/exacerbated condition to be followed going forward.

## 2025-01-14 NOTE — HISTORY OF PRESENT ILLNESS
[FreeTextEntry1] : Mr. DAVID NICK, 61 year old male, former smoker, w/ hx of newly dx'ed metastatic rectal cancer with rectosigmoid perforation s/p open Curry's procedure, now on chemo (start date 3/11/2023) who presented for abnormal CT Chest. Referred by Dr. Ramon.  CT Chest on 2/8/24: - 2.6 cm mixed ggo and solid nodule in the RUL containing dilated airways - stable small to moderate Lt pleural effusion with subadjacent atelectasis - resolved right pleural effusion - lesions in the right hepatic lobe has increased in size  Started chemo on 3/11/2023 every 2 weeks for 6 cycles with Dr. Mcdonald- stopped chemo after 1 cycle for Castro Bronch. Resumed 2nd cycle of chemo on 7/8/2024  MRI of the head on 3/20/2024: - A 2 mm enhancing right posterior parafalcine extra-axial lesion (16:98, 17:156).  PET on 4/17/2024: - Tunneled right chest port with tip in SVC - 3.6 x 2.2 cm stable mixed attenuation RIGHT UPPER lobe pulmonary lesion (SUV 1.2, image 94), with mild uptake - Intensely increased uptake at the RIGHT lung base without clear low-dose CT correlate (finding has SUV 6.6, image 139, SUV 6.6, image 141) placement. - There are some similar uptake is seen at the posterolateral RIGHT lung base (SUV 7.0, image 147). - Small LEFT pleural effusion with mild uptake. - centrally photopenic, peripherally hypermetabolic inferior RIGHT lobe liver mass (4.0 x 2.8 cm, SUV 10.7, image 169) - Suspected LEFT retroperitoneal node not well-defined on low-dose CT (SUV 3.8, image 208). - Small LEFT pelvic sidewall node (0.5 x 0.4 cm, SUV 6.2, image 237). - Small peritoneal nodule at anterior LEFT hemiabdomen (0.7 x 0.5 cm, SUV 2.8, image 185). - There is intense uptake at a perihepatic ascites adjacent to the inferior RIGHT lobe (SUV 13.5, image 161). - There is an additional peritoneal implant suspected at the LEFT paracolic region (SUV 8.4, image 131). - Increased uptake is seen at the anterior midline of the pelvis (SUV 34.3, image 242).  Now s/p navigational broncho with Dr. Jaquez on 6/11/24. Path right upper lung FORCEPS/CRYO BIOPSY AND ROBOTIC ASSISTED FNA revealed NON-DIAGNOSTIC. Path of Lymph node level 7, LN 11RS is negative for carcinoma, no epithelial lesion identified. Path of right upper lung BAL negative for malignant cells.  CT Chest on 8/13/24: - there are new subpleural patchy groundglass opacities, measuring up to 1.2 cm, in posterior segment of right upper lobe and apicoposterior segment of left upper lobe (series 4, image 173) - there is redemonstration of a 3.4 x 2.3 cm right upper lobe lesion with associated cluster of small cysts and dilated peripheral airways, similar in size, but with increased density. - there is redemonstration of reticulations and subpleural small consolidation in left lower lobe, which may represent scarring/round atelectasis - few stable micronodules are noted (for instance 2 mm micronodule in right lower lobe on series 4, image 782). There is a new branching opacity in left upper lobe (series 4, image 324), likely impacted airway. - there are multiple low-attenuation cystic lesions in the liver parenchyma with the largest on measuring up to 2.7 cm, unchanged  PFTs on 8/21/24: FVC 68%, FEV1 81%, DLCO 67%  Now 3 mons s/p R.A., Rt VATS, RUL wedge rxn, RUL completion lobectomy, MLND, pneumolysis on 9/27/24. Path revealed invasive acinar AdenoCA, 2.7 cm, G2, all margins and LNs are negative, [id number], Stg IA3.  CT Chest on 12/5/24: - Again post right upper lobectomy. New consolidation with "atoll sign" in superior segment left lower lobe (3/54).  - There are several additional peripherally located groundglass nodular opacities in both lungs. Rounded atelectasis present left lower lobe. - No change small, partially loculated right pleural effusion.  - No change mild left pleural thickening.  - Multiple low-density liver lesions again present, some shown to be cysts and/or biliary hamartomas on prior studies. A more solid appearing mass in segment VI is partially visualized and was previously shown to be a metastasis.  He presents today for 3 months follow up.  Pt reports doing well, denies SOB, cough or CP. Pt is back on chemotherapy with Dr. Mcdonald.

## 2025-01-14 NOTE — CONSULT LETTER
[FreeTextEntry2] : Usman Ramon MD (Colon/Rec/Onc) [FreeTextEntry3] : Norma Ram MD Attending Surgeon Division of Thoracic Surgery , [facility] [employer] at [facility]

## 2025-01-14 NOTE — CONSULT LETTER
[FreeTextEntry2] : Usman Ramon MD (Colon/Rec/Onc) [FreeTextEntry3] : Norma Ram MD Attending Surgeon Division of Thoracic Surgery , [facility] [employer] [facility] at [facility]

## 2025-03-03 NOTE — ASSESSMENT
[FreeTextEntry1] : Mr. DAVDI NICK is a 60 year M metastatic proximal rectal cancer (metastatic to liver) causing perforation s/p emergent open Curry's procedure 12/30/2023 here for a follow-up visit. Final pathology [id number] LNs 1/17 positive LNs, negative PNI, positive LVI, negative margins. BRAF and MMR intact. Presents today for potential liver surgical options.  s/p Rt VATS, RUL wedge rxn, RUL completion lobectomy, MLND, pneumolysis 09/27/2024, Path-Adenocarcinoma, acinar type predominant.  [id number], Stg IA3.   10/30/2024 MR Abd- 1. There is a 3.8 cm metastasis in segment VI of the liver with probable extracapsular extension. There are a few additional subcapsular liver masses that probably represent metastases. 2. Interval right upper lobectomy. Signal abnormality and abnormal enhancement anterolateral aspect of right lower chest wall could represent postoperative change, but correlation with surgical approach recommended in order to rule out chest wall metastases. 3. There are new nodular opacities in both lungs.   11/01/2024 PET-PET-11/01/2024  1. Non-avid postsurgical changes in the right upper lobe. New ground glass opacities in both lung fields as detailed above with mild activity since most recent CT in 8/2024. These may represent inflammatory process; however, malignant process is not excluded. Follow-up CT chest in 2-4 weeks recommended. 2. Smaller extent of increased activity in the posterior sulcus of the right lower lobe and less extensive pleuro parenchymal and atelectatic changes in the left lower lobe with improvement in extent and FDG activity. These findings suggest partial response to recent systemic therapy. 3. FDG avid hypodense lesion in hepatic segment 6 with photopenic necrotic component and peripheral rind of intense activity compatible with persistent metastatic process; likely improvement of other subcapsular lesions in segment 7 and segment 6/7. Findings may reflect partial response to therapy. 4. Interval resolution of foci of increased activity in the left anterior abdominal wall, left paracolic gutter, left retroperitoneal and left pelvic sidewall.  CT Chest 12/05/2025- 1. Since 11/4/2024, there is a new area of consolidation with an "atoll sign "appearance in the left lower lobe. This finding, in conjunction with the peripherally located groundglass nodular opacities bilaterally, is suspicious for organizing pneumonia. The possibility that one or more of these lesions is on the spectrum of adenocarcinoma cannot be excluded. Clinical correlation and continued follow-up recommended. 2. No change small right pleural effusion. 3. A liver metastasis is partially visualized.   CEA 11/2024 = 117 > 142, > 184,   2/18/2025- 155  CT A/P 1/2025 w/ stable liver mets. D/t peripheral neuropathy, we held FOLFOX, continue w/ STEPAHNIE for maintenance, and give maintenance Xeloda 1500mg BID 1 week on 1 week off (flat dose) starting 2/2025 01/02/2025 CT AP (NW)- . Metastatic lesion in segment 5/6 (2:55) 4.8 x3.1 cm previously 4.6 x 2.5 cm remeasured at MRI 10/30/2024. This corresponds to the hypermetabolic focus at recent PET CT. Capsular implants along the posterior right hepatic lobe correspond with hypermetabolic activity at recent PET CT and are without significant change.  PLAN:    I have discussed the diagnosis, therapeutic plan and options with the patient at length. Patient expressed verbal understanding to proceed with proposed plan. All questions answered.

## 2025-03-03 NOTE — CONSULT LETTER
[Dear  ___] : Dear  [unfilled], [Courtesy Letter:] : I had the pleasure of seeing your patient, [unfilled], in my office today. [Please see my note below.] : Please see my note below. [Consult Closing:] : Thank you very much for allowing me to participate in the care of this patient.  If you have any questions, please do not hesitate to contact me. [Sincerely,] : Sincerely, [FreeTextEntry3] : Yuan Dent MD, RYAN, FACS Director of Surgical Oncology- [facility] , Department of Surgery [facility] at [place] [id number] Ph: [phone] Cell: [phone] [DrGasper  ___] : Dr. CATHERINE [DrGasper ___] : Dr. CATHERINE

## 2025-03-03 NOTE — HISTORY OF PRESENT ILLNESS
[de-identified] : HPI: Mr. DAVID NICK is a 61y/o M w/ metastatic proximal rectal cancer (metastatic to liver) causing perforation s/p emergent open Curry's procedure 2023-presented to [facility] w/ abdominal pain 23. Final pathology [id number] LNs  positive LNs, negative PNI, positive LVI, negative margins. BRAF and MMR intact. Ostomy function intact. Currently receiving chemo 3/6 cycle.  Referred by Dr. Ramon   3/29/24 CTA LIVER: Ill-defined hypoattenuating lesion measuring 3.8 x 2.3 cm in the right lobe and a more posterior lesion along the surface of the right hepatic lobe measuring approximately 4.1 x 1.1 cm.  24: Patient reports feeling well today. Denies fever, chills, N/V/D, unintentional weight loss. Ostomy intact. Has hard stools but on stool softener now. Currently on 3/6 cycle (3month course) chemo. Reports IR drain was removed ~3weeks ago.   PET/CT  Multiple foci of intensely increased uptake at the lung bases without clear low-dose CT correlate, suspicious for malignant involvement. Suspected LEFT retroperitoneal and LEFT pelvic sidewall nodes, peritoneal nodule LEFT hemiabdomen, and suspected peritoneal implant LEFT paracolic region, suspicious for metastatic disease. Small LEFT pleural effusion with mild uptake, indeterminate for malignancy.  24: Patient reports hold on chemo to complete Lung bx. Due to restart chemotherapy in July. No new complaints. Denies fever, chills, N/V/D, unintentional weight loss.  s/p Rt VATS, RUL wedge rxn, RUL completion lobectomy, MLND, pneumolysis 2024, Path-Adenocarcinoma, acinar type predominant. Last Avastin 2024 with Dr. Mcdonald. 10/22/2024- Pt reports feeling well. Tolerating diet, has gain a couple of pounds. Denies nausea, vomiting, diarrhea, hematochezia or steatorrhea, and unintentional weight loss at this time. Ostomy site is pink and functioning with brown stool. Wife concerned patient has been having 2 beers daily.   10/30/2024 MR Abd- 1. There is a 3.8 cm metastasis in segment VI of the liver with probable extracapsular extension. There are a few additional subcapsular liver masses that probably represent metastases. 2. Interval right upper lobectomy. Signal abnormality and abnormal enhancement anterolateral aspect of right lower chest wall could represent postoperative change, but correlation with surgical approach recommended in order to rule out chest wall metastases. 3. There are new nodular opacities in both lungs.   2024 PET-PET-2024  1. Non-avid postsurgical changes in the right upper lobe. New ground glass opacities in both lung fields as detailed above with mild activity since most recent CT in 2024. These may represent inflammatory process; however, malignant process is not excluded. Follow-up CT chest in 2-4 weeks recommended. 2. Smaller extent of increased activity in the posterior sulcus of the right lower lobe and less extensive pleuro parenchymal and atelectatic changes in the left lower lobe with improvement in extent and FDG activity. These findings suggest partial response to recent systemic therapy. 3. FDG avid hypodense lesion in hepatic segment 6 with photopenic necrotic component and peripheral rind of intense activity compatible with persistent metastatic process; likely improvement of other subcapsular lesions in segment 7 and segment 6/7. Findings may reflect partial response to therapy. 4. Interval resolution of foci of increased activity in the left anterior abdominal wall, left paracolic gutter, left retroperitoneal and left pelvic sidewall.  2024- Pt reports feeling well, has gained 1 lbs. Stoma site looks viable with formed stool. Denies etoh use in the past month.   PMHX: none PSH: Curry's 2023 Social: Quit all substance usage but prior to sx in : 1 pack cigs/day; bottle of wine per day, no illicit drug use Med: Senna FHx: unsure ECO  Unemployed PCP: in search for one Med/onc: Dr. Martins Thorac: Dr. Ram - lung lesion

## 2025-03-03 NOTE — REASON FOR VISIT
[Follow-Up Visit] : a follow-up visit for [FreeTextEntry2] : Metastatic Rectal Cancer to the liver s/p emergent Ballard procedure 12/2023 presents today for liver surgical options.

## 2025-03-16 NOTE — CONSULT LETTER
[FreeTextEntry3] : Yuan Dent MD, RYAN, FACS Director of Surgical Oncology- [facility] , Department of Surgery [facility] at [place] [id number] Ph: [phone] Cell: [phone]

## 2025-03-16 NOTE — HISTORY OF PRESENT ILLNESS
[de-identified] : HPI: Mr. 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 Final pathology [id number] LNs  positive LNs, negative PNI, positive LVI, negative margins. BRAF and MMR intact. Ostomy function intact. Currently receiving chemo 3/6 cycle.  Referred by Dr. Ramon   3/29/24 CTA LIVER: Ill-defined hypoattenuating lesion measuring 3.8 x 2.3 cm in the right lobe and a more posterior lesion along the surface of the right hepatic lobe measuring approximately 4.1 x 1.1 cm.  24: Patient reports feeling well today. Denies fever, chills, N/V/D, unintentional weight loss. Ostomy intact. Has hard stools but on stool softener now. Currently on 3/6 cycle (3month course) chemo. Reports IR drain was removed ~3weeks ago.   PET/CT  Multiple foci of intensely increased uptake at the lung bases without clear low-dose CT correlate, suspicious for malignant involvement. Suspected LEFT retroperitoneal and LEFT pelvic sidewall nodes, peritoneal nodule LEFT hemiabdomen, and suspected peritoneal implant LEFT paracolic region, suspicious for metastatic disease. Small LEFT pleural effusion with mild uptake, indeterminate for malignancy.  24: Patient reports hold on chemo to complete Lung bx. Due to restart chemotherapy in July. No new complaints. Denies fever, chills, N/V/D, unintentional weight loss.  s/p Rt VATS, RUL wedge rxn, RUL completion lobectomy, MLND, pneumolysis 2024, Path-Adenocarcinoma, acinar type predominant. Last Avastin 2024 with Dr. Mcdonald. 10/22/2024- Pt reports feeling well. Tolerating diet, has gain a couple of pounds. Denies nausea, vomiting, diarrhea, hematochezia or steatorrhea, and unintentional weight loss at this time. Ostomy site is pink and functioning with brown stool. Wife concerned patient has been having 2 beers daily.   10/30/2024 MR Abd- 1. There is a 3.8 cm metastasis in segment VI of the liver with probable extracapsular extension. There are a few additional subcapsular liver masses that probably represent metastases. 2. Interval right upper lobectomy. Signal abnormality and abnormal enhancement anterolateral aspect of right lower chest wall could represent postoperative change, but correlation with surgical approach recommended in order to rule out chest wall metastases. 3. There are new nodular opacities in both lungs.   2024 PET-PET-2024  1. Non-avid postsurgical changes in the right upper lobe. New ground glass opacities in both lung fields as detailed above with mild activity since most recent CT in 2024. These may represent inflammatory process; however, malignant process is not excluded. Follow-up CT chest in 2-4 weeks recommended. 2. Smaller extent of increased activity in the posterior sulcus of the right lower lobe and less extensive pleuro parenchymal and atelectatic changes in the left lower lobe with improvement in extent and FDG activity. These findings suggest partial response to recent systemic therapy. 3. FDG avid hypodense lesion in hepatic segment 6 with photopenic necrotic component and peripheral rind of intense activity compatible with persistent metastatic process; likely improvement of other subcapsular lesions in segment 7 and segment 6/7. Findings may reflect partial response to therapy. 4. Interval resolution of foci of increased activity in the left anterior abdominal wall, left paracolic gutter, left retroperitoneal and left pelvic sidewall.  2024- Pt reports feeling well, has gained 1 lbs. Stoma site looks viable with formed stool. Denies etoh use in the past month.   2025- Pt reports to the office today feeling well. Denies abdominal pain, n/v, diarrhea, blood in the stool, tolerating diet, and denies weight loss in the last 2 months. No generalized jaundice noted or acholic stools. Pt's stoma noted to be pink in color with soft stool. Denies ETOH use in the past 3 months.   PMHX: none PSH: Curry's 2023 Social: Quit all substance usage but prior to sx in : 1 pack cigs/day; bottle of wine per day, no illicit drug use Med: Senna FHx: unsure ECO  Unemployed PCP: in search for one Med/onc: Dr. Martins Thorac: Dr. Ram - lung lesion

## 2025-03-16 NOTE — ASSESSMENT
[FreeTextEntry1] : Mr. 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 Final pathology [id number] LNs 1/17 positive LNs, negative PNI, positive LVI, negative margins. BRAF and MMR intact. Presents today for potential liver surgical options.  s/p Rt VATS, RUL wedge rxn, RUL completion lobectomy, MLND, pneumolysis 09/27/2024, Path-Adenocarcinoma, acinar type predominant.  [id number], Stg IA3.   10/30/2024 MR Abd- 1. There is a 3.8 cm metastasis in segment VI of the liver with probable extracapsular extension. There are a few additional subcapsular liver masses that probably represent metastases. 2. Interval right upper lobectomy. Signal abnormality and abnormal enhancement anterolateral aspect of right lower chest wall could represent postoperative change, but correlation with surgical approach recommended in order to rule out chest wall metastases. 3. There are new nodular opacities in both lungs.   11/01/2024 PET-PET-11/01/2024  1. Non-avid postsurgical changes in the right upper lobe. New ground glass opacities in both lung fields as detailed above with mild activity since most recent CT in 8/2024. These may represent inflammatory process; however, malignant process is not excluded. Follow-up CT chest in 2-4 weeks recommended. 2. Smaller extent of increased activity in the posterior sulcus of the right lower lobe and less extensive pleuro parenchymal and atelectatic changes in the left lower lobe with improvement in extent and FDG activity. These findings suggest partial response to recent systemic therapy. 3. FDG avid hypodense lesion in hepatic segment 6 with photopenic necrotic component and peripheral rind of intense activity compatible with persistent metastatic process; likely improvement of other subcapsular lesions in segment 7 and segment 6/7. Findings may reflect partial response to therapy. 4. Interval resolution of foci of increased activity in the left anterior abdominal wall, left paracolic gutter, left retroperitoneal and left pelvic sidewall.  CT Chest 12/05/2025- 1. Since 11/4/2024, there is a new area of consolidation with an "atoll sign "appearance in the left lower lobe. This finding, in conjunction with the peripherally located groundglass nodular opacities bilaterally, is suspicious for organizing pneumonia. The possibility that one or more of these lesions is on the spectrum of adenocarcinoma cannot be excluded. Clinical correlation and continued follow-up recommended. 2. No change small right pleural effusion. 3. A liver metastasis is partially visualized.  CEA 11/2024 = 117 > 142, > 184,  2/18/2025- 155  CT A/P 1/2025 w/ stable liver mets. D/t peripheral neuropathy, we held FOLFOX, continue w/ STEPHANIE for maintenance, and give maintenance Xeloda 1500mg BID 1 week on 1 week off (flat dose) starting 2/2025 01/02/2025 CT AP (NW)- Metastatic lesion in segment 5/6 (2:55) 4.8 x3.1 cm previously 4.6 x 2.5 cm remeasured at MRI 10/30/2024. This corresponds to the hypermetabolic focus at recent PET CT. Capsular implants along the posterior right hepatic lobe correspond with hypermetabolic activity at recent PET CT and are without significant change.  PLAN:  -Mixed tumor response in the liver on systemic chemo-  -Will discuss case at [facility] for treatment options -Tele next week  I have discussed the diagnosis, therapeutic plan and options with the patient at length. Patient expressed verbal understanding to proceed with proposed plan. All questions answered.

## 2025-03-16 NOTE — CONSULT LETTER
[FreeTextEntry3] : Yuan Dent MD, RYAN, FACS Director of Surgical Oncology- [facility], [place] , Department of Surgery [facility] at [place] [id number] Ph: [phone] Cell: [phone]

## 2025-03-16 NOTE — ASSESSMENT
[FreeTextEntry1] : Mr. 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 Final pathology [id number] LNs 1/17 positive LNs, negative PNI, positive LVI, negative margins. BRAF and MMR intact. Presents today for potential liver surgical options.  s/p Rt VATS, RUL wedge rxn, RUL completion lobectomy, MLND, pneumolysis 09/27/2024, Path-Adenocarcinoma, acinar type predominant.  [id number], Stg IA3.   10/30/2024 MR Abd- 1. There is a 3.8 cm metastasis in segment VI of the liver with probable extracapsular extension. There are a few additional subcapsular liver masses that probably represent metastases. 2. Interval right upper lobectomy. Signal abnormality and abnormal enhancement anterolateral aspect of right lower chest wall could represent postoperative change, but correlation with surgical approach recommended in order to rule out chest wall metastases. 3. There are new nodular opacities in both lungs.   11/01/2024 PET-PET-11/01/2024  1. Non-avid postsurgical changes in the right upper lobe. New ground glass opacities in both lung fields as detailed above with mild activity since most recent CT in 8/2024. These may represent inflammatory process; however, malignant process is not excluded. Follow-up CT chest in 2-4 weeks recommended. 2. Smaller extent of increased activity in the posterior sulcus of the right lower lobe and less extensive pleuro parenchymal and atelectatic changes in the left lower lobe with improvement in extent and FDG activity. These findings suggest partial response to recent systemic therapy. 3. FDG avid hypodense lesion in hepatic segment 6 with photopenic necrotic component and peripheral rind of intense activity compatible with persistent metastatic process; likely improvement of other subcapsular lesions in segment 7 and segment 6/7. Findings may reflect partial response to therapy. 4. Interval resolution of foci of increased activity in the left anterior abdominal wall, left paracolic gutter, left retroperitoneal and left pelvic sidewall.  CT Chest 12/05/2025- 1. Since 11/4/2024, there is a new area of consolidation with an "atoll sign "appearance in the left lower lobe. This finding, in conjunction with the peripherally located groundglass nodular opacities bilaterally, is suspicious for organizing pneumonia. The possibility that one or more of these lesions is on the spectrum of adenocarcinoma cannot be excluded. Clinical correlation and continued follow-up recommended. 2. No change small right pleural effusion. 3. A liver metastasis is partially visualized.  CEA 11/2024 = 117 > 142, > 184,  2/18/2025- 155  CT A/P 1/2025 w/ stable liver mets. D/t peripheral neuropathy, we held FOLFOX, continue w/ STEPHANIE for maintenance, and give maintenance Xeloda 1500mg BID 1 week on 1 week off (flat dose) starting 2/2025 01/02/2025 CT AP (NW)- Metastatic lesion in segment 5/6 (2:55) 4.8 x3.1 cm previously 4.6 x 2.5 cm remeasured at MRI 10/30/2024. This corresponds to the hypermetabolic focus at recent PET CT. Capsular implants along the posterior right hepatic lobe correspond with hypermetabolic activity at recent PET CT and are without significant change.  PLAN:  -Mixed tumor response in the liver on systemic chemo-  -Will discuss case at [facility] for treatment options -Tele next week  I have discussed the diagnosis, therapeutic plan and options with the patient at length. Patient expressed verbal understanding to proceed with proposed plan. All questions answered.

## 2025-03-16 NOTE — CONSULT LETTER
[FreeTextEntry3] : Yuan Dent MD, RYAN, FACS Director of Surgical Oncology- [facility] , Department of Surgery [facility] at [place] [id number] Ph: [phone] Cell: [phone]

## 2025-03-16 NOTE — PHYSICAL EXAM
[Normal] : supple, no neck mass and thyroid not enlarged [Normal Neck Lymph Nodes] : normal neck lymph nodes  [Normal Supraclavicular Lymph Nodes] : normal supraclavicular lymph nodes [Normal Groin Lymph Nodes] : normal groin lymph nodes [Normal Axillary Lymph Nodes] : normal axillary lymph nodes [Normal] : oriented to person, place and time, with appropriate affect [FreeTextEntry1] :   COVID -19 precautions as per [facility] policy was universally followed. [de-identified] : Abdomen soft, nontender, nondistended, no palpable masses. Pt's stoma noted to be pink in color with soft stool.

## 2025-03-16 NOTE — PHYSICAL EXAM
[Normal] : supple, no neck mass and thyroid not enlarged [Normal Neck Lymph Nodes] : normal neck lymph nodes  [Normal Supraclavicular Lymph Nodes] : normal supraclavicular lymph nodes [Normal Groin Lymph Nodes] : normal groin lymph nodes [Normal Axillary Lymph Nodes] : normal axillary lymph nodes [Normal] : oriented to person, place and time, with appropriate affect [FreeTextEntry1] :   COVID -19 precautions as per [facility] policy was universally followed. [de-identified] : Abdomen soft, nontender, nondistended, no palpable masses. Pt's stoma noted to be pink in color with soft stool.

## 2025-03-16 NOTE — HISTORY OF PRESENT ILLNESS
[de-identified] : HPI: Mr. 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 Final pathology [id number] LNs  positive LNs, negative PNI, positive LVI, negative margins. BRAF and MMR intact. Ostomy function intact. Currently receiving chemo 3/6 cycle.  Referred by Dr. Ramon   3/29/24 CTA LIVER: Ill-defined hypoattenuating lesion measuring 3.8 x 2.3 cm in the right lobe and a more posterior lesion along the surface of the right hepatic lobe measuring approximately 4.1 x 1.1 cm.  24: Patient reports feeling well today. Denies fever, chills, N/V/D, unintentional weight loss. Ostomy intact. Has hard stools but on stool softener now. Currently on 3/6 cycle (3month course) chemo. Reports IR drain was removed ~3weeks ago.   PET/CT  Multiple foci of intensely increased uptake at the lung bases without clear low-dose CT correlate, suspicious for malignant involvement. Suspected LEFT retroperitoneal and LEFT pelvic sidewall nodes, peritoneal nodule LEFT hemiabdomen, and suspected peritoneal implant LEFT paracolic region, suspicious for metastatic disease. Small LEFT pleural effusion with mild uptake, indeterminate for malignancy.  24: Patient reports hold on chemo to complete Lung bx. Due to restart chemotherapy in July. No new complaints. Denies fever, chills, N/V/D, unintentional weight loss.  s/p Rt VATS, RUL wedge rxn, RUL completion lobectomy, MLND, pneumolysis 2024, Path-Adenocarcinoma, acinar type predominant. Last Avastin 2024 with Dr. Mcdonald. 10/22/2024- Pt reports feeling well. Tolerating diet, has gain a couple of pounds. Denies nausea, vomiting, diarrhea, hematochezia or steatorrhea, and unintentional weight loss at this time. Ostomy site is pink and functioning with brown stool. Wife concerned patient has been having 2 beers daily.   10/30/2024 MR Abd- 1. There is a 3.8 cm metastasis in segment VI of the liver with probable extracapsular extension. There are a few additional subcapsular liver masses that probably represent metastases. 2. Interval right upper lobectomy. Signal abnormality and abnormal enhancement anterolateral aspect of right lower chest wall could represent postoperative change, but correlation with surgical approach recommended in order to rule out chest wall metastases. 3. There are new nodular opacities in both lungs.   2024 PET-PET-2024  1. Non-avid postsurgical changes in the right upper lobe. New ground glass opacities in both lung fields as detailed above with mild activity since most recent CT in 2024. These may represent inflammatory process; however, malignant process is not excluded. Follow-up CT chest in 2-4 weeks recommended. 2. Smaller extent of increased activity in the posterior sulcus of the right lower lobe and less extensive pleuro parenchymal and atelectatic changes in the left lower lobe with improvement in extent and FDG activity. These findings suggest partial response to recent systemic therapy. 3. FDG avid hypodense lesion in hepatic segment 6 with photopenic necrotic component and peripheral rind of intense activity compatible with persistent metastatic process; likely improvement of other subcapsular lesions in segment 7 and segment 6/7. Findings may reflect partial response to therapy. 4. Interval resolution of foci of increased activity in the left anterior abdominal wall, left paracolic gutter, left retroperitoneal and left pelvic sidewall.  2024- Pt reports feeling well, has gained 1 lbs. Stoma site looks viable with formed stool. Denies etoh use in the past month.   2025- Pt reports to the office today feeling well. Denies abdominal pain, n/v, diarrhea, blood in the stool, tolerating diet, and denies weight loss in the last 2 months. No generalized jaundice noted or acholic stools. Pt's stoma noted to be pink in color with soft stool. Denies ETOH use in the past 3 months.   PMHX: none PSH: Curry's 2023 Social: Quit all substance usage but prior to sx in : 1 pack cigs/day; bottle of wine per day, no illicit drug use Med: Senna FHx: unsure ECO  Unemployed PCP: in search for one Med/onc: Dr. Martins Thorac: Dr. Ram - lung lesion

## 2025-03-16 NOTE — ASSESSMENT
[FreeTextEntry1] : Mr. 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 Final pathology [id number] LNs 1/17 positive LNs, negative PNI, positive LVI, negative margins. BRAF and MMR intact. Presents today for potential liver surgical options.  s/p Rt VATS, RUL wedge rxn, RUL completion lobectomy, MLND, pneumolysis 09/27/2024, Path-Adenocarcinoma, acinar type predominant.  [id number], Stg IA3.   10/30/2024 MR Abd- 1. There is a 3.8 cm metastasis in segment VI of the liver with probable extracapsular extension. There are a few additional subcapsular liver masses that probably represent metastases. 2. Interval right upper lobectomy. Signal abnormality and abnormal enhancement anterolateral aspect of right lower chest wall could represent postoperative change, but correlation with surgical approach recommended in order to rule out chest wall metastases. 3. There are new nodular opacities in both lungs.   11/01/2024 PET-PET-11/01/2024  1. Non-avid postsurgical changes in the right upper lobe. New ground glass opacities in both lung fields as detailed above with mild activity since most recent CT in 8/2024. These may represent inflammatory process; however, malignant process is not excluded. Follow-up CT chest in 2-4 weeks recommended. 2. Smaller extent of increased activity in the posterior sulcus of the right lower lobe and less extensive pleuro parenchymal and atelectatic changes in the left lower lobe with improvement in extent and FDG activity. These findings suggest partial response to recent systemic therapy. 3. FDG avid hypodense lesion in hepatic segment 6 with photopenic necrotic component and peripheral rind of intense activity compatible with persistent metastatic process; likely improvement of other subcapsular lesions in segment 7 and segment 6/7. Findings may reflect partial response to therapy. 4. Interval resolution of foci of increased activity in the left anterior abdominal wall, left paracolic gutter, left retroperitoneal and left pelvic sidewall.  CT Chest 12/05/2025- 1. Since 11/4/2024, there is a new area of consolidation with an "atoll sign "appearance in the left lower lobe. This finding, in conjunction with the peripherally located groundglass nodular opacities bilaterally, is suspicious for organizing pneumonia. The possibility that one or more of these lesions is on the spectrum of adenocarcinoma cannot be excluded. Clinical correlation and continued follow-up recommended. 2. No change small right pleural effusion. 3. A liver metastasis is partially visualized.  CEA 11/2024 = 117 > 142, > 184,  2/18/2025- 155  CT A/P 1/2025 w/ stable liver mets. D/t peripheral neuropathy, we held FOLFOX, continue w/ STEPHANIE for maintenance, and give maintenance Xeloda 1500mg BID 1 week on 1 week off (flat dose) starting 2/2025 01/02/2025 CT AP (NW)- Metastatic lesion in segment 5/6 (2:55) 4.8 x3.1 cm previously 4.6 x 2.5 cm remeasured at MRI 10/30/2024. This corresponds to the hypermetabolic focus at recent PET CT. Capsular implants along the posterior right hepatic lobe correspond with hypermetabolic activity at recent PET CT and are without significant change.  PLAN:  -Mixed tumor response in the liver on systemic chemo-  -Will discuss case at [facility] for treatment options -Tele next week  I have discussed the diagnosis, therapeutic plan and options with the patient at length. Patient expressed verbal understanding to proceed with proposed plan. All questions answered.

## 2025-03-16 NOTE — PHYSICAL EXAM
[Normal] : supple, no neck mass and thyroid not enlarged [Normal Neck Lymph Nodes] : normal neck lymph nodes  [Normal Supraclavicular Lymph Nodes] : normal supraclavicular lymph nodes [Normal Groin Lymph Nodes] : normal groin lymph nodes [Normal Axillary Lymph Nodes] : normal axillary lymph nodes [Normal] : oriented to person, place and time, with appropriate affect [FreeTextEntry1] :   COVID -19 precautions as per [facility] policy was universally followed. [de-identified] : Abdomen soft, nontender, nondistended, no palpable masses. Pt's stoma noted to be pink in color with soft stool.

## 2025-03-16 NOTE — PHYSICAL EXAM
[Normal] : supple, no neck mass and thyroid not enlarged [Normal Neck Lymph Nodes] : normal neck lymph nodes  [Normal Supraclavicular Lymph Nodes] : normal supraclavicular lymph nodes [Normal Groin Lymph Nodes] : normal groin lymph nodes [Normal Axillary Lymph Nodes] : normal axillary lymph nodes [Normal] : oriented to person, place and time, with appropriate affect [FreeTextEntry1] :   COVID -19 precautions as per [facility] policy was universally followed. [de-identified] : Abdomen soft, nontender, nondistended, no palpable masses. Pt's stoma noted to be pink in color with soft stool.

## 2025-03-16 NOTE — HISTORY OF PRESENT ILLNESS
[de-identified] : HPI: Mr. 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 Final pathology [id number] LNs  positive LNs, negative PNI, positive LVI, negative margins. BRAF and MMR intact. Ostomy function intact. Currently receiving chemo 3/6 cycle.  Referred by Dr. Ramon   3/29/24 CTA LIVER: Ill-defined hypoattenuating lesion measuring 3.8 x 2.3 cm in the right lobe and a more posterior lesion along the surface of the right hepatic lobe measuring approximately 4.1 x 1.1 cm.  24: Patient reports feeling well today. Denies fever, chills, N/V/D, unintentional weight loss. Ostomy intact. Has hard stools but on stool softener now. Currently on 3/6 cycle (3month course) chemo. Reports IR drain was removed ~3weeks ago.   PET/CT  Multiple foci of intensely increased uptake at the lung bases without clear low-dose CT correlate, suspicious for malignant involvement. Suspected LEFT retroperitoneal and LEFT pelvic sidewall nodes, peritoneal nodule LEFT hemiabdomen, and suspected peritoneal implant LEFT paracolic region, suspicious for metastatic disease. Small LEFT pleural effusion with mild uptake, indeterminate for malignancy.  24: Patient reports hold on chemo to complete Lung bx. Due to restart chemotherapy in July. No new complaints. Denies fever, chills, N/V/D, unintentional weight loss.  s/p Rt VATS, RUL wedge rxn, RUL completion lobectomy, MLND, pneumolysis 2024, Path-Adenocarcinoma, acinar type predominant. Last Avastin 2024 with Dr. Mcdonald. 10/22/2024- Pt reports feeling well. Tolerating diet, has gain a couple of pounds. Denies nausea, vomiting, diarrhea, hematochezia or steatorrhea, and unintentional weight loss at this time. Ostomy site is pink and functioning with brown stool. Wife concerned patient has been having 2 beers daily.   10/30/2024 MR Abd- 1. There is a 3.8 cm metastasis in segment VI of the liver with probable extracapsular extension. There are a few additional subcapsular liver masses that probably represent metastases. 2. Interval right upper lobectomy. Signal abnormality and abnormal enhancement anterolateral aspect of right lower chest wall could represent postoperative change, but correlation with surgical approach recommended in order to rule out chest wall metastases. 3. There are new nodular opacities in both lungs.   2024 PET-PET-2024  1. Non-avid postsurgical changes in the right upper lobe. New ground glass opacities in both lung fields as detailed above with mild activity since most recent CT in 2024. These may represent inflammatory process; however, malignant process is not excluded. Follow-up CT chest in 2-4 weeks recommended. 2. Smaller extent of increased activity in the posterior sulcus of the right lower lobe and less extensive pleuro parenchymal and atelectatic changes in the left lower lobe with improvement in extent and FDG activity. These findings suggest partial response to recent systemic therapy. 3. FDG avid hypodense lesion in hepatic segment 6 with photopenic necrotic component and peripheral rind of intense activity compatible with persistent metastatic process; likely improvement of other subcapsular lesions in segment 7 and segment 6/7. Findings may reflect partial response to therapy. 4. Interval resolution of foci of increased activity in the left anterior abdominal wall, left paracolic gutter, left retroperitoneal and left pelvic sidewall.  2024- Pt reports feeling well, has gained 1 lbs. Stoma site looks viable with formed stool. Denies etoh use in the past month.   2025- Pt reports to the office today feeling well. Denies abdominal pain, n/v, diarrhea, blood in the stool, tolerating diet, and denies weight loss in the last 2 months. No generalized jaundice noted or acholic stools. Pt's stoma noted to be pink in color with soft stool. Denies ETOH use in the past 3 months.   PMHX: none PSH: Curry's 2023 Social: Quit all substance usage but prior to sx in : 1 pack cigs/day; bottle of wine per day, no illicit drug use Med: Senna FHx: unsure ECO  Unemployed PCP: in search for one Med/onc: Dr. Martins Thorac: Dr. Ram - lung lesion

## 2025-03-16 NOTE — ASSESSMENT
[FreeTextEntry1] : Mr. 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 Final pathology [id number] LNs 1/17 positive LNs, negative PNI, positive LVI, negative margins. BRAF and MMR intact. Presents today for potential liver surgical options.  s/p Rt VATS, RUL wedge rxn, RUL completion lobectomy, MLND, pneumolysis 09/27/2024, Path-Adenocarcinoma, acinar type predominant.  [id number], Stg IA3.   10/30/2024 MR Abd- 1. There is a 3.8 cm metastasis in segment VI of the liver with probable extracapsular extension. There are a few additional subcapsular liver masses that probably represent metastases. 2. Interval right upper lobectomy. Signal abnormality and abnormal enhancement anterolateral aspect of right lower chest wall could represent postoperative change, but correlation with surgical approach recommended in order to rule out chest wall metastases. 3. There are new nodular opacities in both lungs.   11/01/2024 PET-PET-11/01/2024  1. Non-avid postsurgical changes in the right upper lobe. New ground glass opacities in both lung fields as detailed above with mild activity since most recent CT in 8/2024. These may represent inflammatory process; however, malignant process is not excluded. Follow-up CT chest in 2-4 weeks recommended. 2. Smaller extent of increased activity in the posterior sulcus of the right lower lobe and less extensive pleuro parenchymal and atelectatic changes in the left lower lobe with improvement in extent and FDG activity. These findings suggest partial response to recent systemic therapy. 3. FDG avid hypodense lesion in hepatic segment 6 with photopenic necrotic component and peripheral rind of intense activity compatible with persistent metastatic process; likely improvement of other subcapsular lesions in segment 7 and segment 6/7. Findings may reflect partial response to therapy. 4. Interval resolution of foci of increased activity in the left anterior abdominal wall, left paracolic gutter, left retroperitoneal and left pelvic sidewall.  CT Chest 12/05/2025- 1. Since 11/4/2024, there is a new area of consolidation with an "atoll sign "appearance in the left lower lobe. This finding, in conjunction with the peripherally located groundglass nodular opacities bilaterally, is suspicious for organizing pneumonia. The possibility that one or more of these lesions is on the spectrum of adenocarcinoma cannot be excluded. Clinical correlation and continued follow-up recommended. 2. No change small right pleural effusion. 3. A liver metastasis is partially visualized.  CEA 11/2024 = 117 > 142, > 184,  2/18/2025- 155  CT A/P 1/2025 w/ stable liver mets. D/t peripheral neuropathy, we held FOLFOX, continue w/ STEPHANIE for maintenance, and give maintenance Xeloda 1500mg BID 1 week on 1 week off (flat dose) starting 2/2025 01/02/2025 CT AP (NW)- Metastatic lesion in segment 5/6 (2:55) 4.8 x3.1 cm previously 4.6 x 2.5 cm remeasured at MRI 10/30/2024. This corresponds to the hypermetabolic focus at recent PET CT. Capsular implants along the posterior right hepatic lobe correspond with hypermetabolic activity at recent PET CT and are without significant change.  PLAN:  -Mixed tumor response in the liver on systemic chemo-  -Will discuss case at [facility] for treatment options -Tele next week  I have discussed the diagnosis, therapeutic plan and options with the patient at length. Patient expressed verbal understanding to proceed with proposed plan. All questions answered.

## 2025-03-16 NOTE — REASON FOR VISIT
[FreeTextEntry2] : Metastatic Rectal Cancer to the liver s/p emergent Ballard procedure 12/2023 presents today for liver surgical options.

## 2025-03-16 NOTE — HISTORY OF PRESENT ILLNESS
[de-identified] : HPI: Mr. 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 Final pathology [id number] LNs  positive LNs, negative PNI, positive LVI, negative margins. BRAF and MMR intact. Ostomy function intact. Currently receiving chemo 3/6 cycle.  Referred by Dr. Ramon   3/29/24 CTA LIVER: Ill-defined hypoattenuating lesion measuring 3.8 x 2.3 cm in the right lobe and a more posterior lesion along the surface of the right hepatic lobe measuring approximately 4.1 x 1.1 cm.  24: Patient reports feeling well today. Denies fever, chills, N/V/D, unintentional weight loss. Ostomy intact. Has hard stools but on stool softener now. Currently on 3/6 cycle (3month course) chemo. Reports IR drain was removed ~3weeks ago.   PET/CT  Multiple foci of intensely increased uptake at the lung bases without clear low-dose CT correlate, suspicious for malignant involvement. Suspected LEFT retroperitoneal and LEFT pelvic sidewall nodes, peritoneal nodule LEFT hemiabdomen, and suspected peritoneal implant LEFT paracolic region, suspicious for metastatic disease. Small LEFT pleural effusion with mild uptake, indeterminate for malignancy.  24: Patient reports hold on chemo to complete Lung bx. Due to restart chemotherapy in July. No new complaints. Denies fever, chills, N/V/D, unintentional weight loss.  s/p Rt VATS, RUL wedge rxn, RUL completion lobectomy, MLND, pneumolysis 2024, Path-Adenocarcinoma, acinar type predominant. Last Avastin 2024 with Dr. Mcdonald. 10/22/2024- Pt reports feeling well. Tolerating diet, has gain a couple of pounds. Denies nausea, vomiting, diarrhea, hematochezia or steatorrhea, and unintentional weight loss at this time. Ostomy site is pink and functioning with brown stool. Wife concerned patient has been having 2 beers daily.   10/30/2024 MR Abd- 1. There is a 3.8 cm metastasis in segment VI of the liver with probable extracapsular extension. There are a few additional subcapsular liver masses that probably represent metastases. 2. Interval right upper lobectomy. Signal abnormality and abnormal enhancement anterolateral aspect of right lower chest wall could represent postoperative change, but correlation with surgical approach recommended in order to rule out chest wall metastases. 3. There are new nodular opacities in both lungs.   2024 PET-PET-2024  1. Non-avid postsurgical changes in the right upper lobe. New ground glass opacities in both lung fields as detailed above with mild activity since most recent CT in 2024. These may represent inflammatory process; however, malignant process is not excluded. Follow-up CT chest in 2-4 weeks recommended. 2. Smaller extent of increased activity in the posterior sulcus of the right lower lobe and less extensive pleuro parenchymal and atelectatic changes in the left lower lobe with improvement in extent and FDG activity. These findings suggest partial response to recent systemic therapy. 3. FDG avid hypodense lesion in hepatic segment 6 with photopenic necrotic component and peripheral rind of intense activity compatible with persistent metastatic process; likely improvement of other subcapsular lesions in segment 7 and segment 6/7. Findings may reflect partial response to therapy. 4. Interval resolution of foci of increased activity in the left anterior abdominal wall, left paracolic gutter, left retroperitoneal and left pelvic sidewall.  2024- Pt reports feeling well, has gained 1 lbs. Stoma site looks viable with formed stool. Denies etoh use in the past month.   2025- Pt reports to the office today feeling well. Denies abdominal pain, n/v, diarrhea, blood in the stool, tolerating diet, and denies weight loss in the last 2 months. No generalized jaundice noted or acholic stools. Pt's stoma noted to be pink in color with soft stool. Denies ETOH use in the past 3 months.   PMHX: none PSH: Curry's 2023 Social: Quit all substance usage but prior to sx in : 1 pack cigs/day; bottle of wine per day, no illicit drug use Med: Senna FHx: unsure ECO  Unemployed PCP: in search for one Med/onc: Dr. Martins Thorac: Dr. Ram - lung lesion

## 2025-03-16 NOTE — CONSULT LETTER
[FreeTextEntry3] : Yuan Dent MD, RYAN, FACS Director of Surgical Oncology- [facility] , Department of Surgery [facility] at [place] [id number] Ph: [phone] Cell: [phone]

## 2025-03-19 NOTE — HISTORY OF PRESENT ILLNESS
[de-identified] : HPI: Mr. 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 Final pathology [id number] LNs  positive LNs, negative PNI, positive LVI, negative margins. BRAF and MMR intact. Ostomy function intact. Currently receiving chemo 3/6 cycle.  Referred by Dr. Ramon   3/29/24 CTA LIVER: Ill-defined hypoattenuating lesion measuring 3.8 x 2.3 cm in the right lobe and a more posterior lesion along the surface of the right hepatic lobe measuring approximately 4.1 x 1.1 cm.  24: Patient reports feeling well today. Denies fever, chills, N/V/D, unintentional weight loss. Ostomy intact. Has hard stools but on stool softener now. Currently on 3/6 cycle (3month course) chemo. Reports IR drain was removed ~3weeks ago.   PET/CT  Multiple foci of intensely increased uptake at the lung bases without clear low-dose CT correlate, suspicious for malignant involvement. Suspected LEFT retroperitoneal and LEFT pelvic sidewall nodes, peritoneal nodule LEFT hemiabdomen, and suspected peritoneal implant LEFT paracolic region, suspicious for metastatic disease. Small LEFT pleural effusion with mild uptake, indeterminate for malignancy.  24: Patient reports hold on chemo to complete Lung bx. Due to restart chemotherapy in July. No new complaints. Denies fever, chills, N/V/D, unintentional weight loss.  s/p Rt VATS, RUL wedge rxn, RUL completion lobectomy, MLND, pneumolysis 2024, Path-Adenocarcinoma, acinar type predominant. Last Avastin 2024 with Dr. Mcdonald. 10/22/2024- Pt reports feeling well. Tolerating diet, has gain a couple of pounds. Denies nausea, vomiting, diarrhea, hematochezia or steatorrhea, and unintentional weight loss at this time. Ostomy site is pink and functioning with brown stool. Wife concerned patient has been having 2 beers daily.   10/30/2024 MR Abd- 1. There is a 3.8 cm metastasis in segment VI of the liver with probable extracapsular extension. There are a few additional subcapsular liver masses that probably represent metastases. 2. Interval right upper lobectomy. Signal abnormality and abnormal enhancement anterolateral aspect of right lower chest wall could represent postoperative change, but correlation with surgical approach recommended in order to rule out chest wall metastases. 3. There are new nodular opacities in both lungs.   2024 PET-PET-2024  1. Non-avid postsurgical changes in the right upper lobe. New ground glass opacities in both lung fields as detailed above with mild activity since most recent CT in 2024. These may represent inflammatory process; however, malignant process is not excluded. Follow-up CT chest in 2-4 weeks recommended. 2. Smaller extent of increased activity in the posterior sulcus of the right lower lobe and less extensive pleuro parenchymal and atelectatic changes in the left lower lobe with improvement in extent and FDG activity. These findings suggest partial response to recent systemic therapy. 3. FDG avid hypodense lesion in hepatic segment 6 with photopenic necrotic component and peripheral rind of intense activity compatible with persistent metastatic process; likely improvement of other subcapsular lesions in segment 7 and segment 6/7. Findings may reflect partial response to therapy. 4. Interval resolution of foci of increased activity in the left anterior abdominal wall, left paracolic gutter, left retroperitoneal and left pelvic sidewall.  2024- Pt reports feeling well, has gained 1 lbs. Stoma site looks viable with formed stool. Denies etoh use in the past month.   2025- Pt reports to the office today feeling well. Denies abdominal pain, n/v, diarrhea, blood in the stool, tolerating diet, and denies weight loss in the last 2 months. No generalized jaundice noted or acholic stools. Pt's stoma noted to be pink in color with soft stool. Denies ETOH use in the past 3 months.   2025- Pt reported feeling well. Advised to repeat CT scan as discuss during [facility] TB meeting.   PMHX: none PSH: Curry's 2023 Social: Quit all substance usage but prior to sx in : 1 pack cigs/day; bottle of wine per day, no illicit drug use Med: Senna FHx: unsure ECO  Unemployed PCP: in search for one Med/onc: Dr. Martins Thoracic: Dr. Ram - lung lesion

## 2025-03-19 NOTE — ASSESSMENT
[FreeTextEntry1] : Mr. 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 Final pathology [id number] LNs 1/17 positive LNs, negative PNI, positive LVI, negative margins. BRAF and MMR intact. Presents today for potential liver surgical options.  s/p Rt VATS, RUL wedge rxn, RUL completion lobectomy, MLND, pneumolysis 09/27/2024, Path-Adenocarcinoma, acinar type predominant.  [id number], Stg IA3.   10/30/2024 MR Abd- 1. There is a 3.8 cm metastasis in segment VI of the liver with probable extracapsular extension. There are a few additional subcapsular liver masses that probably represent metastases. 2. Interval right upper lobectomy. Signal abnormality and abnormal enhancement anterolateral aspect of right lower chest wall could represent postoperative change, but correlation with surgical approach recommended in order to rule out chest wall metastases. 3. There are new nodular opacities in both lungs.   11/01/2024 PET-PET-11/01/2024  1. Non-avid postsurgical changes in the right upper lobe. New ground glass opacities in both lung fields as detailed above with mild activity since most recent CT in 8/2024. These may represent inflammatory process; however, malignant process is not excluded. Follow-up CT chest in 2-4 weeks recommended. 2. Smaller extent of increased activity in the posterior sulcus of the right lower lobe and less extensive pleuro parenchymal and atelectatic changes in the left lower lobe with improvement in extent and FDG activity. These findings suggest partial response to recent systemic therapy. 3. FDG avid hypodense lesion in hepatic segment 6 with photopenic necrotic component and peripheral rind of intense activity compatible with persistent metastatic process; likely improvement of other subcapsular lesions in segment 7 and segment 6/7. Findings may reflect partial response to therapy. 4. Interval resolution of foci of increased activity in the left anterior abdominal wall, left paracolic gutter, left retroperitoneal and left pelvic sidewall.  CT Chest 12/05/2025- 1. Since 11/4/2024, there is a new area of consolidation with an "atoll sign "appearance in the left lower lobe. This finding, in conjunction with the peripherally located groundglass nodular opacities bilaterally, is suspicious for organizing pneumonia. The possibility that one or more of these lesions is on the spectrum of adenocarcinoma cannot be excluded. Clinical correlation and continued follow-up recommended. 2. No change small right pleural effusion. 3. A liver metastasis is partially visualized.  CEA 11/2024 = 117 > 142, > 184,  2/18/2025- 155  CT A/P 1/2025 w/ stable liver mets. D/t peripheral neuropathy, we held FOLFOX, continue w/ STEPHANIE for maintenance, and give maintenance Xeloda 1500mg BID 1 week on 1 week off (flat dose) starting 2/2025 01/02/2025 CT AP (NW)- Metastatic lesion in segment 5/6 (2:55) 4.8 x3.1 cm previously 4.6 x 2.5 cm remeasured at MRI 10/30/2024. This corresponds to the hypermetabolic focus at recent PET CT. Capsular implants along the posterior right hepatic lobe correspond with hypermetabolic activity at recent PET CT and are without significant change.  [facility]  [facility] - CT AP ordered to reassess liver lesions and new capsular lesions 04/17/2025 as there is an uptrend of serum CEA -RPA in May  I have discussed the diagnosis, therapeutic plan and options with the patient at length. Patient expressed verbal understanding to proceed with proposed plan. All questions answered.

## 2025-03-19 NOTE — CONSULT LETTER
[FreeTextEntry3] : Yuan Dent MD, RYAN, FACS Director of Surgical Oncology- [facility] , Department of Surgery [facility] at [place] [id number] Ph: [phone] Cell: [phone]

## 2025-04-22 NOTE — PHYSICAL EXAM
[FreeTextEntry1] :   COVID -19 precautions as per [facility] policy was universally followed. [Normal] : supple, no neck mass and thyroid not enlarged [Normal Neck Lymph Nodes] : normal neck lymph nodes  [Normal Supraclavicular Lymph Nodes] : normal supraclavicular lymph nodes [Normal Groin Lymph Nodes] : normal groin lymph nodes [Normal Axillary Lymph Nodes] : normal axillary lymph nodes [Normal] : oriented to person, place and time, with appropriate affect [de-identified] : colostomy in place with stool.

## 2025-04-22 NOTE — CONSULT LETTER
[Dear  ___] : Dear  [unfilled], [Courtesy Letter:] : I had the pleasure of seeing your patient, [unfilled], in my office today. [Please see my note below.] : Please see my note below. [Consult Closing:] : Thank you very much for allowing me to participate in the care of this patient.  If you have any questions, please do not hesitate to contact me. [Sincerely,] : Sincerely, [DrGasper  ___] : Dr. CATHERINE [DrGasper ___] : Dr. CATHERINE [FreeTextEntry3] : Yuan Dent MD, RYAN, FACS Director of Surgical Oncology- [facility] , Department of Surgery [facility] [place] at [place] [id number] Ph: [phone] Cell: [phone]

## 2025-04-22 NOTE — HISTORY OF PRESENT ILLNESS
[de-identified] : Chief Complaint: Pt presents for potential surgical resection of the hepatic metastasis at right hepatic lobe  HPI: Mr. 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 Final pathology [id number] LNs  positive LNs, negative PNI, positive LVI, negative margins. BRAF and MMR intact. Ostomy function intact. Currently receiving chemo 3/6 cycle.  Referred by Dr. Ramon   3/29/24 CTA LIVER: Ill-defined hypoattenuating lesion measuring 3.8 x 2.3 cm in the right lobe and a more posterior lesion along the surface of the right hepatic lobe measuring approximately 4.1 x 1.1 cm.  24: Patient reports feeling well today. Denies fever, chills, N/V/D, unintentional weight loss. Ostomy intact. Has hard stools but on stool softener now. Currently on 3/6 cycle (3month course) chemo. Reports IR drain was removed ~3weeks ago.   PET/CT  Multiple foci of intensely increased uptake at the lung bases without clear low-dose CT correlate, suspicious for malignant involvement. Suspected LEFT retroperitoneal and LEFT pelvic sidewall nodes, peritoneal nodule LEFT hemiabdomen, and suspected peritoneal implant LEFT paracolic region, suspicious for metastatic disease. Small LEFT pleural effusion with mild uptake, indeterminate for malignancy.  24: Patient reports hold on chemo to complete Lung bx. Due to restart chemotherapy in July. No new complaints. Denies fever, chills, N/V/D, unintentional weight loss.  s/p Rt VATS, RUL wedge rxn, RUL completion lobectomy, MLND, pneumolysis 2024, Path-Adenocarcinoma, acinar type predominant. Last Avastin 2024 with Dr. Mcdonald. 10/22/2024- Pt reports feeling well. Tolerating diet, has gain a couple of pounds. Denies nausea, vomiting, diarrhea, hematochezia or steatorrhea, and unintentional weight loss at this time. Ostomy site is pink and functioning with brown stool. Wife concerned patient has been having 2 beers daily.   10/30/2024 MR Abd- 1. There is a 3.8 cm metastasis in segment VI of the liver with probable extracapsular extension. There are a few additional subcapsular liver masses that probably represent metastases. 2. Interval right upper lobectomy. Signal abnormality and abnormal enhancement anterolateral aspect of right lower chest wall could represent postoperative change, but correlation with surgical approach recommended in order to rule out chest wall metastases. 3. There are new nodular opacities in both lungs.   2024 PET-PET-2024  1. Non-avid postsurgical changes in the right upper lobe. New ground glass opacities in both lung fields as detailed above with mild activity since most recent CT in 2024. These may represent inflammatory process; however, malignant process is not excluded. Follow-up CT chest in 2-4 weeks recommended. 2. Smaller extent of increased activity in the posterior sulcus of the right lower lobe and less extensive pleuro parenchymal and atelectatic changes in the left lower lobe with improvement in extent and FDG activity. These findings suggest partial response to recent systemic therapy. 3. FDG avid hypodense lesion in hepatic segment 6 with photopenic necrotic component and peripheral rind of intense activity compatible with persistent metastatic process; likely improvement of other subcapsular lesions in segment 7 and segment 6/7. Findings may reflect partial response to therapy. 4. Interval resolution of foci of increased activity in the left anterior abdominal wall, left paracolic gutter, left retroperitoneal and left pelvic sidewall.  2024- Pt reports feeling well, has gained 1 lbs. Stoma site looks viable with formed stool. Denies etoh use in the past month.   2025- Pt reports to the office today feeling well. Denies abdominal pain, n/v, diarrhea, blood in the stool, tolerating diet, and denies weight loss in the last 2 months. No generalized jaundice noted or acholic stools. Pt's stoma noted to be pink in color with soft stool. Denies ETOH use in the past 3 months.   2025- Pt reported feeling well. Advised to repeat CT scan as discuss during [facility] TB meeting.   2025 Reports feeling well. Colostomy has been functioning well. Denies abdominal pain, n/v, has been tolerating diet.   PMHX: none PSH: Curry's 2023 Social: Quit all substance usage but prior to sx in : 1 pack cigs/day; bottle of wine per day, no illicit drug use Med: Senna FHx: unsure ECO  Unemployed PCP: in search for one Med/onc: Dr. Martins Thoracic: Dr. Ram - lung lesion

## 2025-04-22 NOTE — PHYSICAL EXAM
[FreeTextEntry1] :   COVID -19 precautions as per [facility] policy was universally followed. [Normal] : supple, no neck mass and thyroid not enlarged [Normal Neck Lymph Nodes] : normal neck lymph nodes  [Normal Supraclavicular Lymph Nodes] : normal supraclavicular lymph nodes [Normal Groin Lymph Nodes] : normal groin lymph nodes [Normal Axillary Lymph Nodes] : normal axillary lymph nodes [Normal] : oriented to person, place and time, with appropriate affect [de-identified] : colostomy in place with stool.

## 2025-04-22 NOTE — CONSULT LETTER
[Dear  ___] : Dear  [unfilled], [Courtesy Letter:] : I had the pleasure of seeing your patient, [unfilled], in my office today. [Please see my note below.] : Please see my note below. [Consult Closing:] : Thank you very much for allowing me to participate in the care of this patient.  If you have any questions, please do not hesitate to contact me. [Sincerely,] : Sincerely, [DrGasper  ___] : Dr. CATHERINE [DrGasper ___] : Dr. CATHERINE [FreeTextEntry3] : Yuan Dent MD, RYAN, FACS Director of Surgical Oncology- [facility] , Department of Surgery [facility] at [place] [id number] Ph: [phone] Cell: [phone]

## 2025-04-22 NOTE — ASSESSMENT
[FreeTextEntry1] : Mr. 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 Final pathology [id number] LNs 1/17 positive LNs, negative PNI, positive LVI, negative margins. BRAF and MMR intact. Presents today for potential liver surgical options.  s/p Rt VATS, RUL wedge rxn, RUL completion lobectomy, MLND, pneumolysis 09/27/2024, Path-Adenocarcinoma, acinar type predominant.  [id number], Stg IA3.   10/30/2024 MR Abd- 1. There is a 3.8 cm metastasis in segment VI of the liver with probable extracapsular extension. There are a few additional subcapsular liver masses that probably represent metastases. 2. Interval right upper lobectomy. Signal abnormality and abnormal enhancement anterolateral aspect of right lower chest wall could represent postoperative change, but correlation with surgical approach recommended in order to rule out chest wall metastases. 3. There are new nodular opacities in both lungs.   11/01/2024 PET-PET-11/01/2024  1. Non-avid postsurgical changes in the right upper lobe. New ground glass opacities in both lung fields as detailed above with mild activity since most recent CT in 8/2024. These may represent inflammatory process; however, malignant process is not excluded. Follow-up CT chest in 2-4 weeks recommended. 2. Smaller extent of increased activity in the posterior sulcus of the right lower lobe and less extensive pleuro parenchymal and atelectatic changes in the left lower lobe with improvement in extent and FDG activity. These findings suggest partial response to recent systemic therapy. 3. FDG avid hypodense lesion in hepatic segment 6 with photopenic necrotic component and peripheral rind of intense activity compatible with persistent metastatic process; likely improvement of other subcapsular lesions in segment 7 and segment 6/7. Findings may reflect partial response to therapy. 4. Interval resolution of foci of increased activity in the left anterior abdominal wall, left paracolic gutter, left retroperitoneal and left pelvic sidewall.  CT Chest 12/05/2025- 1. Since 11/4/2024, there is a new area of consolidation with an "atoll sign "appearance in the left lower lobe. This finding, in conjunction with the peripherally located groundglass nodular opacities bilaterally, is suspicious for organizing pneumonia. The possibility that one or more of these lesions is on the spectrum of adenocarcinoma cannot be excluded. Clinical correlation and continued follow-up recommended. 2. No change small right pleural effusion. 3. A liver metastasis is partially visualized.  CEA 11/2024 = 117 > 142, > 184,  2/18/2025- 155  CT A/P 1/2025 w/ stable liver mets. D/t peripheral neuropathy, we held FOLFOX, continue w/ STEPHANIE for maintenance, and give maintenance Xeloda 1500mg BID 1 week on 1 week off (flat dose) starting 2/2025 01/02/2025 CT AP (NW)- Metastatic lesion in segment 5/6 (2:55) 4.8 x3.1 cm previously 4.6 x 2.5 cm remeasured at MRI 10/30/2024. This corresponds to the hypermetabolic focus at recent PET CT. Capsular implants along the posterior right hepatic lobe correspond with hypermetabolic activity at recent PET CT and are without significant change.  03/28/2025- CT AP (NW)- Inferior right hepatic metastasis as well as multiple right hepatic subserosal implant involving the right hepatic flexure, similar as prior exam.   [facility]  [facility] -Liver IR bx to [employer]  I have discussed the diagnosis, therapeutic plan and options with the patient at length. Patient expressed verbal understanding to proceed with proposed plan. All questions answered.

## 2025-04-22 NOTE — ASSESSMENT
[FreeTextEntry1] : Mr. 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 Final pathology [id number] LNs 1/17 positive LNs, negative PNI, positive LVI, negative margins. BRAF and MMR intact. Presents today for potential liver surgical options.  s/p Rt VATS, RUL wedge rxn, RUL completion lobectomy, MLND, pneumolysis 09/27/2024, Path-Adenocarcinoma, acinar type predominant.  [id number], Stg IA3.   10/30/2024 MR Abd- 1. There is a 3.8 cm metastasis in segment VI of the liver with probable extracapsular extension. There are a few additional subcapsular liver masses that probably represent metastases. 2. Interval right upper lobectomy. Signal abnormality and abnormal enhancement anterolateral aspect of right lower chest wall could represent postoperative change, but correlation with surgical approach recommended in order to rule out chest wall metastases. 3. There are new nodular opacities in both lungs.   11/01/2024 PET-PET-11/01/2024  1. Non-avid postsurgical changes in the right upper lobe. New ground glass opacities in both lung fields as detailed above with mild activity since most recent CT in 8/2024. These may represent inflammatory process; however, malignant process is not excluded. Follow-up CT chest in 2-4 weeks recommended. 2. Smaller extent of increased activity in the posterior sulcus of the right lower lobe and less extensive pleuro parenchymal and atelectatic changes in the left lower lobe with improvement in extent and FDG activity. These findings suggest partial response to recent systemic therapy. 3. FDG avid hypodense lesion in hepatic segment 6 with photopenic necrotic component and peripheral rind of intense activity compatible with persistent metastatic process; likely improvement of other subcapsular lesions in segment 7 and segment 6/7. Findings may reflect partial response to therapy. 4. Interval resolution of foci of increased activity in the left anterior abdominal wall, left paracolic gutter, left retroperitoneal and left pelvic sidewall.  CT Chest 12/05/2025- 1. Since 11/4/2024, there is a new area of consolidation with an "atoll sign "appearance in the left lower lobe. This finding, in conjunction with the peripherally located groundglass nodular opacities bilaterally, is suspicious for organizing pneumonia. The possibility that one or more of these lesions is on the spectrum of adenocarcinoma cannot be excluded. Clinical correlation and continued follow-up recommended. 2. No change small right pleural effusion. 3. A liver metastasis is partially visualized.  CEA 11/2024 = 117 > 142, > 184,  2/18/2025- 155  CT A/P 1/2025 w/ stable liver mets. D/t peripheral neuropathy, we held FOLFOX, continue w/ STEPHANIE for maintenance, and give maintenance Xeloda 1500mg BID 1 week on 1 week off (flat dose) starting 2/2025 01/02/2025 CT AP (NW)- Metastatic lesion in segment 5/6 (2:55) 4.8 x3.1 cm previously 4.6 x 2.5 cm remeasured at MRI 10/30/2024. This corresponds to the hypermetabolic focus at recent PET CT. Capsular implants along the posterior right hepatic lobe correspond with hypermetabolic activity at recent PET CT and are without significant change.  03/28/2025- CT AP (NW)- Inferior right hepatic metastasis as well as multiple right hepatic subserosal implant involving the right hepatic flexure, similar as prior exam.   [facility] [place] [facility] -Liver IR bx to [facility]  I have discussed the diagnosis, therapeutic plan and options with the patient at length. Patient expressed verbal understanding to proceed with proposed plan. All questions answered.

## 2025-04-22 NOTE — HISTORY OF PRESENT ILLNESS
[de-identified] : Chief Complaint: Pt presents for potential surgical resection of the hepatic metastasis at right hepatic lobe  HPI: Mr. 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 Final pathology [id number] LNs  positive LNs, negative PNI, positive LVI, negative margins. BRAF and MMR intact. Ostomy function intact. Currently receiving chemo 3/6 cycle.  Referred by Dr. Ramon   3/29/24 CTA LIVER: Ill-defined hypoattenuating lesion measuring 3.8 x 2.3 cm in the right lobe and a more posterior lesion along the surface of the right hepatic lobe measuring approximately 4.1 x 1.1 cm.  24: Patient reports feeling well today. Denies fever, chills, N/V/D, unintentional weight loss. Ostomy intact. Has hard stools but on stool softener now. Currently on 3/6 cycle (3month course) chemo. Reports IR drain was removed ~3weeks ago.   PET/CT  Multiple foci of intensely increased uptake at the lung bases without clear low-dose CT correlate, suspicious for malignant involvement. Suspected LEFT retroperitoneal and LEFT pelvic sidewall nodes, peritoneal nodule LEFT hemiabdomen, and suspected peritoneal implant LEFT paracolic region, suspicious for metastatic disease. Small LEFT pleural effusion with mild uptake, indeterminate for malignancy.  24: Patient reports hold on chemo to complete Lung bx. Due to restart chemotherapy in July. No new complaints. Denies fever, chills, N/V/D, unintentional weight loss.  s/p Rt VATS, RUL wedge rxn, RUL completion lobectomy, MLND, pneumolysis 2024, Path-Adenocarcinoma, acinar type predominant. Last Avastin 2024 with Dr. Mcdoanld. 10/22/2024- Pt reports feeling well. Tolerating diet, has gain a couple of pounds. Denies nausea, vomiting, diarrhea, hematochezia or steatorrhea, and unintentional weight loss at this time. Ostomy site is pink and functioning with brown stool. Wife concerned patient has been having 2 beers daily.   10/30/2024 MR Abd- 1. There is a 3.8 cm metastasis in segment VI of the liver with probable extracapsular extension. There are a few additional subcapsular liver masses that probably represent metastases. 2. Interval right upper lobectomy. Signal abnormality and abnormal enhancement anterolateral aspect of right lower chest wall could represent postoperative change, but correlation with surgical approach recommended in order to rule out chest wall metastases. 3. There are new nodular opacities in both lungs.   2024 PET-PET-2024  1. Non-avid postsurgical changes in the right upper lobe. New ground glass opacities in both lung fields as detailed above with mild activity since most recent CT in 2024. These may represent inflammatory process; however, malignant process is not excluded. Follow-up CT chest in 2-4 weeks recommended. 2. Smaller extent of increased activity in the posterior sulcus of the right lower lobe and less extensive pleuro parenchymal and atelectatic changes in the left lower lobe with improvement in extent and FDG activity. These findings suggest partial response to recent systemic therapy. 3. FDG avid hypodense lesion in hepatic segment 6 with photopenic necrotic component and peripheral rind of intense activity compatible with persistent metastatic process; likely improvement of other subcapsular lesions in segment 7 and segment 6/7. Findings may reflect partial response to therapy. 4. Interval resolution of foci of increased activity in the left anterior abdominal wall, left paracolic gutter, left retroperitoneal and left pelvic sidewall.  2024- Pt reports feeling well, has gained 1 lbs. Stoma site looks viable with formed stool. Denies etoh use in the past month.   2025- Pt reports to the office today feeling well. Denies abdominal pain, n/v, diarrhea, blood in the stool, tolerating diet, and denies weight loss in the last 2 months. No generalized jaundice noted or acholic stools. Pt's stoma noted to be pink in color with soft stool. Denies ETOH use in the past 3 months.   2025- Pt reported feeling well. Advised to repeat CT scan as discuss during [facility] TB meeting.   2025 Reports feeling well. Colostomy has been functioning well. Denies abdominal pain, n/v, has been tolerating diet.   PMHX: none PSH: Curry's 2023 Social: Quit all substance usage but prior to sx in : 1 pack cigs/day; bottle of wine per day, no illicit drug use Med: Senna FHx: unsure ECO  Unemployed PCP: in search for one Med/onc: Dr. Martins Thoracic: Dr. Ram - lung lesion

## 2025-04-22 NOTE — REASON FOR VISIT
[Follow-Up Visit] : a follow-up visit for [FreeTextEntry2] : Colon cancer with metastasis to the liver

## 2025-05-02 NOTE — CONSULT LETTER
[Dear  ___] : Dear  [unfilled], [Consult Letter:] : I had the pleasure of evaluating your patient, [unfilled]. [( Thank you for referring [unfilled] for consultation for _____ )] : Thank you for referring [unfilled] for consultation for [unfilled] [Please see my note below.] : Please see my note below. [Consult Closing:] : Thank you very much for allowing me to participate in the care of this patient.  If you have any questions, please do not hesitate to contact me. [Sincerely,] : Sincerely, [FreeTextEntry3] : Clifton Hinton MD, MS Vascular & Interventional Radiologist [facility]

## 2025-05-02 NOTE — REVIEW OF SYSTEMS
[Fever] : no fever [Chills] : no chills [Feeling Poorly] : not feeling poorly [Feeling Tired] : not feeling tired [Recent Weight Loss (___ Lbs)] : no recent weight loss [Nosebleeds] : no nosebleeds [Chest Pain] : no chest pain [Shortness Of Breath] : no shortness of breath [Abdominal Pain] : no abdominal pain [Vomiting] : no vomiting [Easy Bleeding] : no tendency for easy bleeding [Easy Bruising] : no tendency for easy bruising

## 2025-05-02 NOTE — HISTORY OF PRESENT ILLNESS
[FreeTextEntry1] : 61-year-old male with PMH significant for metastatic rectal cancer complicated by perforation s/p emergent open Curry's procedure 12/30/2023, s/p Rt VATS, RUL wedge rxn, RUL completion lobectomy, MLND, pneumolysis 09/27/2024, Path-Adenocarcinoma. Last Avastin 8/19/2024 with Dr. Mcdonald.  10/30/2024 MR Abd- 1. There is a 3.8 cm metastasis in segment VI of the liver with probable extracapsular extension. There are a few additional subcapsular liver masses that probably represent metastases.  11/01/2024 PET-PET - FDG avid hypodense lesion in hepatic segment 6 with photopenic necrotic component and peripheral rind of intense activity compatible with persistent metastatic process; likely improvement of other subcapsular lesions in segment 7 and segment 6/7. Findings may reflect partial response to therapy. - Interval resolution of foci of increased activity in the left anterior abdominal wall, left paracolic gutter, left retroperitoneal and left pelvic sidewall.  CT A/P 1/2025 w/ stable liver mets.  01/02/2025 CT AP (NW)- Metastatic lesion in segment 5/6 (2:55) 4.8 x3.1 cm previously 4.6 x 2.5 cm remeasured at MRI 10/30/2024. This corresponds to the hypermetabolic focus at recent PET CT. Capsular implants along the posterior right hepatic lobe correspond with hypermetabolic activity at recent PET CT and are without significant change.  Today (5/2/25) Patient was evaluated by Dr. Dent and was discussed in [facility] tumor board, and a multidisciplinary decision was made to biopsy the right hepatic mass for sequencing foundation evaluation. Patient presents today to discuss liver mass biopsy.   Patient offers no complaints at this time, stating "I am feeling good now." States that he is slowly gaining weight. Denies CP/SOB, Abd pain/N/V, fever chills.

## 2025-05-02 NOTE — REASON FOR VISIT
[Home] : at home, [unfilled] , at the time of the visit. [Medical Office: ([facility])___] : at the medical office located in  [Telephone (audio)] : This telephonic visit was provided via audio only technology. [Verbal consent obtained from patient] : the patient, [unfilled] [No access to tele-video equipment] : patient lacks access to tele-video equipment. [Consultation] : a consultation visit [FreeTextEntry1] : Liver mass biopsy

## 2025-05-02 NOTE — HISTORY OF PRESENT ILLNESS
[FreeTextEntry1] : 61-year-old male with PMH significant for metastatic rectal cancer complicated by perforation s/p emergent open Curry's procedure 12/30/2023, s/p Rt VATS, RUL wedge rxn, RUL completion lobectomy, MLND, pneumolysis 09/27/2024, Path-Adenocarcinoma. Last Avastin 8/19/2024 with Dr. Mcdonald.  10/30/2024 MR Abd- 1. There is a 3.8 cm metastasis in segment VI of the liver with probable extracapsular extension. There are a few additional subcapsular liver masses that probably represent metastases.  11/01/2024 PET-PET - FDG avid hypodense lesion in hepatic segment 6 with photopenic necrotic component and peripheral rind of intense activity compatible with persistent metastatic process; likely improvement of other subcapsular lesions in segment 7 and segment 6/7. Findings may reflect partial response to therapy. - Interval resolution of foci of increased activity in the left anterior abdominal wall, left paracolic gutter, left retroperitoneal and left pelvic sidewall.  CT A/P 1/2025 w/ stable liver mets.  01/02/2025 CT AP (NW)- Metastatic lesion in segment 5/6 (2:55) 4.8 x3.1 cm previously 4.6 x 2.5 cm remeasured at MRI 10/30/2024. This corresponds to the hypermetabolic focus at recent PET CT. Capsular implants along the posterior right hepatic lobe correspond with hypermetabolic activity at recent PET CT and are without significant change.  Today (5/2/25) Patient was evaluated by Dr. Dent and was discussed in [facility] tumor board, and a multidisciplinary decision was made to biopsy the right hepatic mass for sequencing foundation evaluation. Patient presents today to discuss liver mass biopsy.   Patient offers no complaints at this time, stating "I am feeling good now." States that he is slowly gaining weight. Denies CP/SOB, Abd pain/N/V, fever chills.

## 2025-05-02 NOTE — CONSULT LETTER
[Dear  ___] : Dear  [unfilled], [Consult Letter:] : I had the pleasure of evaluating your patient, [unfilled]. [( Thank you for referring [unfilled] for consultation for _____ )] : Thank you for referring [unfilled] for consultation for [unfilled] [Please see my note below.] : Please see my note below. [Consult Closing:] : Thank you very much for allowing me to participate in the care of this patient.  If you have any questions, please do not hesitate to contact me. [Sincerely,] : Sincerely, [FreeTextEntry3] : Clifton Hinton MD, MS Vascular & Interventional Radiologist [facility]

## 2025-05-02 NOTE — PLAN
[TextEntry] : * Patient/parent was given pre-op instructions at today's visit. * No eating after midnight the night before. Patient can have water up until two hours before scheduled procedure. * No OTC Aspirin five days before procedure, no Ibuprofen, Advil, Aleeve, Motrin for 24 hours prior to procedure. * Patient was instructed to take medications two hours prior to procedure with sips of water. * You must make arrangements prior to procedure for a family member/friend to drive you home after your procedure. * Please bring your ID and insurance cards with you on the day of the procedure. * Please leave all jewelry and valuables at home on the day of the procedure.

## 2025-05-02 NOTE — ASSESSMENT
[FreeTextEntry1] : 62 yo M with PMH significant for metastatic rectal cancer complicated by perforation s/p Curry's procedure 12/30/2023, s/p Rt VATS, RUL wedge rxn, RUL completion lobectomy, MLND, pneumolysis 09/27/2024, Path-Adenocarcinoma. Patient with persistent right hepatic lobe mass, despite treatment. Patient was evaluated by Dr. Dent and was discussed in [facility] tumor board, and a multidisciplinary decision was made to biopsy the right hepatic mass for sequencing foundation evaluation.  The procedure (Image guided biopsy of liver mass) was discussed with the patient in detail. The benefits, alternatives and risks of the procedure, including but not limited to risks of infection, bleeding and possible injury to nearby structures that may require blood transfusion and/or additional procedures were also discussed. All questions were answered and concerns addressed to patient's satisfaction. The patient verbalizes understanding.  Plan: Liver mass biopsy with sedation by anesthesiologist.   25 minutes were spent on this patient encounter using the phone, including obtaining patient's history, reviewing and discussing imaging, as well as discussing the procedure.

## 2025-05-02 NOTE — REASON FOR VISIT
[Home] : at home, [unfilled] , at the time of the visit. [Medical Office: ([facility])___] : at the medical office located in  [Telephone (audio)] : This telephonic visit was provided via audio only technology. [No access to tele-video equipment] : patient lacks access to tele-video equipment. [Verbal consent obtained from patient] : the patient, [unfilled] [Consultation] : a consultation visit [FreeTextEntry1] : Liver mass biopsy

## 2025-05-02 NOTE — ASSESSMENT
[FreeTextEntry1] : 62 yo M with PMH significant for metastatic rectal cancer complicated by perforation s/p Curry's procedure 12/30/2023, s/p Rt VATS, RUL wedge rxn, RUL completion lobectomy, MLND, pneumolysis 09/27/2024, Path-Adenocarcinoma. Patient with persistent right hepatic lobe mass, despite treatment. Patient was evaluated by Dr. Dent and was discussed in [facility] tumor board, and a multidisciplinary decision was made to biopsy the right hepatic mass for sequencing foundation evaluation.  The procedure (Image guided biopsy of liver mass) was discussed with the patient in detail. The benefits, alternatives and risks of the procedure, including but not limited to risks of infection, bleeding and possible injury to nearby structures that may require blood transfusion and/or additional procedures were also discussed. All questions were answered and concerns addressed to patient's satisfaction. The patient verbalizes understanding.  Plan: Liver mass biopsy with sedation by anesthesiologist.   25 minutes were spent on this patient encounter using the phone, including obtaining patient's history, reviewing and discussing imaging, as well as discussing the procedure.

## 2025-05-22 NOTE — PHYSICAL EXAM
[Normal] : supple, no neck mass and thyroid not enlarged [Normal Neck Lymph Nodes] : normal neck lymph nodes  [Normal Supraclavicular Lymph Nodes] : normal supraclavicular lymph nodes [Normal Groin Lymph Nodes] : normal groin lymph nodes [Normal Axillary Lymph Nodes] : normal axillary lymph nodes [Normal] : oriented to person, place and time, with appropriate affect [FreeTextEntry1] :   COVID -19 precautions as per [facility] policy was universally followed. [de-identified] : colostomy in place with stool.

## 2025-05-22 NOTE — PLAN
[FreeTextEntry1] :  #HCM - Hemoglobin A1c, vitamin B12, vitamin D, lipid panel. Patient to be informed of results. - Prostate cancer screening discussed. PSA blood test has been ordered. - The patient has screened negative for depression and excessive alcohol use. - The [place] Preventive Service Task Force recommends yearly lung cancer screening with LDCT for people who have a 20 pack-year or more smoking history and smoke now or have quit within the past 15 years and are between 50 and 80 years old. - Colon cancer screening was discussed at today's visit, currently being treated for colorectal cancer. - EKG performed in the office today is within normal limits. - PCV 20 vaccine administered previously. - Shingles vaccines administered. - Abdominal aortic aneurysm screening is recommended one time in men ages 65-75 years who are current or former smokers. A one-time ultrasound screening for AAA in men ages 65 to 75 who have never smoked but who have a first-degree relative who required repair of an AAA or  from a ruptured AAA. - Advance directives and health Care Proxy discussed during today's visit as above. Completed MOLST form in the chart.

## 2025-05-22 NOTE — HISTORY OF PRESENT ILLNESS
[de-identified] : Chief Complaint: Pt presents for potential surgical resection of the hepatic metastasis at right hepatic lobe  HPI: Mr. DAVID NICK is a 59y/o M w/ metastatic proximal rectal cancer (metastatic to liver) causing perforation s/p emergent open Curry's procedure 2023-presented to [facility] w/ abdominal pain 23. Final pathology [id number] LNs  positive LNs, negative PNI, positive LVI, negative margins. BRAF and MMR intact. Ostomy function intact. Currently receiving chemo 3/6 cycle.  Referred by Dr. Ramon   3/29/24 CTA LIVER: Ill-defined hypoattenuating lesion measuring 3.8 x 2.3 cm in the right lobe and a more posterior lesion along the surface of the right hepatic lobe measuring approximately 4.1 x 1.1 cm.  24: Patient reports feeling well today. Denies fever, chills, N/V/D, unintentional weight loss. Ostomy intact. Has hard stools but on stool softener now. Currently on 3/6 cycle (3month course) chemo. Reports IR drain was removed ~3weeks ago.   PET/CT  Multiple foci of intensely increased uptake at the lung bases without clear low-dose CT correlate, suspicious for malignant involvement. Suspected LEFT retroperitoneal and LEFT pelvic sidewall nodes, peritoneal nodule LEFT hemiabdomen, and suspected peritoneal implant LEFT paracolic region, suspicious for metastatic disease. Small LEFT pleural effusion with mild uptake, indeterminate for malignancy.  24: Patient reports hold on chemo to complete Lung bx. Due to restart chemotherapy in July. No new complaints. Denies fever, chills, N/V/D, unintentional weight loss.  s/p Rt VATS, RUL wedge rxn, RUL completion lobectomy, MLND, pneumolysis 2024, Path-Adenocarcinoma, acinar type predominant. Last Avastin 2024 with Dr. Mcdonald. 10/22/2024- Pt reports feeling well. Tolerating diet, has gain a couple of pounds. Denies nausea, vomiting, diarrhea, hematochezia or steatorrhea, and unintentional weight loss at this time. Ostomy site is pink and functioning with brown stool. Wife concerned patient has been having 2 beers daily.   10/30/2024 MR Abd- 1. There is a 3.8 cm metastasis in segment VI of the liver with probable extracapsular extension. There are a few additional subcapsular liver masses that probably represent metastases. 2. Interval right upper lobectomy. Signal abnormality and abnormal enhancement anterolateral aspect of right lower chest wall could represent postoperative change, but correlation with surgical approach recommended in order to rule out chest wall metastases. 3. There are new nodular opacities in both lungs.   2024 PET-PET-2024  1. Non-avid postsurgical changes in the right upper lobe. New ground glass opacities in both lung fields as detailed above with mild activity since most recent CT in 2024. These may represent inflammatory process; however, malignant process is not excluded. Follow-up CT chest in 2-4 weeks recommended. 2. Smaller extent of increased activity in the posterior sulcus of the right lower lobe and less extensive pleuro parenchymal and atelectatic changes in the left lower lobe with improvement in extent and FDG activity. These findings suggest partial response to recent systemic therapy. 3. FDG avid hypodense lesion in hepatic segment 6 with photopenic necrotic component and peripheral rind of intense activity compatible with persistent metastatic process; likely improvement of other subcapsular lesions in segment 7 and segment 6/7. Findings may reflect partial response to therapy. 4. Interval resolution of foci of increased activity in the left anterior abdominal wall, left paracolic gutter, left retroperitoneal and left pelvic sidewall.  2024- Pt reports feeling well, has gained 1 lbs. Stoma site looks viable with formed stool. Denies etoh use in the past month.   2025- Pt reports to the office today feeling well. Denies abdominal pain, n/v, diarrhea, blood in the stool, tolerating diet, and denies weight loss in the last 2 months. No generalized jaundice noted or acholic stools. Pt's stoma noted to be pink in color with soft stool. Denies ETOH use in the past 3 months.   2025- Pt reported feeling well. Advised to repeat CT scan as discuss during [facility] TB meeting.   2025 Reports feeling well. Colostomy has been functioning well. Denies abdominal pain, n/v, has been tolerating diet.   2025 Pt reports to the office feeling well today. Denies nausea, vomiting, diarrhea, hematochezia or steatorrhea, and unintentional weight loss at this time. Hypertensive today, denies headaches or dizziness. Pt reports in the AM his BP may be elevated. Will f/u with Dr. Martins.   PMHX: none PSH: Curry's 2023 Social: Quit all substance usage but prior to sx in : 1 pack cigs/day; bottle of wine per day, no illicit drug use Med: Senna FHx: unsure ECO  Unemployed PCP: in search for one Med/onc: Dr. Martins Thoracic: Dr. Ram - lung lesion

## 2025-05-22 NOTE — ASSESSMENT
[FreeTextEntry1] : Mr. 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 Final pathology [id number] LNs 1/17 positive LNs, negative PNI, positive LVI, negative margins. BRAF and MMR intact. Presents today for potential liver surgical options.  s/p Rt VATS, RUL wedge rxn, RUL completion lobectomy, MLND, pneumolysis 09/27/2024, Path-Adenocarcinoma, acinar type predominant.  [id number], Stg IA3.   10/30/2024 MR Abd- 1. There is a 3.8 cm metastasis in segment VI of the liver with probable extracapsular extension. There are a few additional subcapsular liver masses that probably represent metastases. 2. Interval right upper lobectomy. Signal abnormality and abnormal enhancement anterolateral aspect of right lower chest wall could represent postoperative change, but correlation with surgical approach recommended in order to rule out chest wall metastases. 3. There are new nodular opacities in both lungs.   11/01/2024 PET-PET-11/01/2024  1. Non-avid postsurgical changes in the right upper lobe. New ground glass opacities in both lung fields as detailed above with mild activity since most recent CT in 8/2024. These may represent inflammatory process; however, malignant process is not excluded. Follow-up CT chest in 2-4 weeks recommended. 2. Smaller extent of increased activity in the posterior sulcus of the right lower lobe and less extensive pleuro parenchymal and atelectatic changes in the left lower lobe with improvement in extent and FDG activity. These findings suggest partial response to recent systemic therapy. 3. FDG avid hypodense lesion in hepatic segment 6 with photopenic necrotic component and peripheral rind of intense activity compatible with persistent metastatic process; likely improvement of other subcapsular lesions in segment 7 and segment 6/7. Findings may reflect partial response to therapy. 4. Interval resolution of foci of increased activity in the left anterior abdominal wall, left paracolic gutter, left retroperitoneal and left pelvic sidewall.  CT Chest 12/05/2025- 1. Since 11/4/2024, there is a new area of consolidation with an "atoll sign "appearance in the left lower lobe. This finding, in conjunction with the peripherally located groundglass nodular opacities bilaterally, is suspicious for organizing pneumonia. The possibility that one or more of these lesions is on the spectrum of adenocarcinoma cannot be excluded. Clinical correlation and continued follow-up recommended. 2. No change small right pleural effusion. 3. A liver metastasis is partially visualized.  CEA 11/2024 = 117 > 142, > 184,  2/18/2025- 155  CT A/P 1/2025 w/ stable liver mets. D/t peripheral neuropathy, we held FOLFOX, continue w/ STEPHANIE for maintenance, and give maintenance Xeloda 1500mg BID 1 week on 1 week off (flat dose) starting 2/2025 01/02/2025 CT AP (NW)- Metastatic lesion in segment 5/6 (2:55) 4.8 x3.1 cm previously 4.6 x 2.5 cm remeasured at MRI 10/30/2024. This corresponds to the hypermetabolic focus at recent PET CT. Capsular implants along the posterior right hepatic lobe correspond with hypermetabolic activity at recent PET CT and are without significant change.  03/28/2025- CT AP (NW)- Inferior right hepatic metastasis as well as multiple right hepatic subserosal implant involving the right hepatic flexure, similar as prior exam.   05/14/2025-Liver, core biopsies -   Metastatic adenocarcinoma in hepatic parenchyma  PLAN:  -Discussed liver bx results  -Continue treatment with Dr. Martins  -Discussed transition of care plan to covering provider with patient  I have discussed the diagnosis, therapeutic plan and options with the patient at length. Patient expressed verbal understanding to proceed with proposed plan. All questions answered.

## 2025-05-22 NOTE — HISTORY OF PRESENT ILLNESS
[FreeTextEntry1] : pt is here for a physical  [de-identified] : 61 M former smoker with metastatic rectal cancer with rectosigmoid perforation s/p open Curry's procedure now on chemotherapy is presenting today for a CPE. Pt is currently holding all treatment including FOLFOX/Shila complicated with peripheral neuropathy and now on Shila maintenance and Xeloda 1500 BID 1week on/1 week off. His blood pressure has also been high based on previous readings. Today, he feels well and denies any fatigue, pain and reports good appetite.

## 2025-05-22 NOTE — PHYSICAL EXAM
[Normal] : supple, no neck mass and thyroid not enlarged [Normal Neck Lymph Nodes] : normal neck lymph nodes  [Normal Supraclavicular Lymph Nodes] : normal supraclavicular lymph nodes [Normal Groin Lymph Nodes] : normal groin lymph nodes [Normal Axillary Lymph Nodes] : normal axillary lymph nodes [Normal] : oriented to person, place and time, with appropriate affect [FreeTextEntry1] :   COVID -19 precautions as per [facility] policy was universally followed. [de-identified] : colostomy in place with stool.

## 2025-05-22 NOTE — HISTORY OF PRESENT ILLNESS
[de-identified] : Chief Complaint: Pt presents for potential surgical resection of the hepatic metastasis at right hepatic lobe  HPI: Mr. DAVID NICK is a 59y/o M w/ metastatic proximal rectal cancer (metastatic to liver) causing perforation s/p emergent open Curry's procedure 2023-presented to [facility] w/ abdominal pain 23. Final pathology [id number] LNs  positive LNs, negative PNI, positive LVI, negative margins. BRAF and MMR intact. Ostomy function intact. Currently receiving chemo 3/6 cycle.  Referred by Dr. Ramon   3/29/24 CTA LIVER: Ill-defined hypoattenuating lesion measuring 3.8 x 2.3 cm in the right lobe and a more posterior lesion along the surface of the right hepatic lobe measuring approximately 4.1 x 1.1 cm.  24: Patient reports feeling well today. Denies fever, chills, N/V/D, unintentional weight loss. Ostomy intact. Has hard stools but on stool softener now. Currently on 3/6 cycle (3month course) chemo. Reports IR drain was removed ~3weeks ago.   PET/CT  Multiple foci of intensely increased uptake at the lung bases without clear low-dose CT correlate, suspicious for malignant involvement. Suspected LEFT retroperitoneal and LEFT pelvic sidewall nodes, peritoneal nodule LEFT hemiabdomen, and suspected peritoneal implant LEFT paracolic region, suspicious for metastatic disease. Small LEFT pleural effusion with mild uptake, indeterminate for malignancy.  24: Patient reports hold on chemo to complete Lung bx. Due to restart chemotherapy in July. No new complaints. Denies fever, chills, N/V/D, unintentional weight loss.  s/p Rt VATS, RUL wedge rxn, RUL completion lobectomy, MLND, pneumolysis 2024, Path-Adenocarcinoma, acinar type predominant. Last Avastin 2024 with Dr. Mcdonald. 10/22/2024- Pt reports feeling well. Tolerating diet, has gain a couple of pounds. Denies nausea, vomiting, diarrhea, hematochezia or steatorrhea, and unintentional weight loss at this time. Ostomy site is pink and functioning with brown stool. Wife concerned patient has been having 2 beers daily.   10/30/2024 MR Abd- 1. There is a 3.8 cm metastasis in segment VI of the liver with probable extracapsular extension. There are a few additional subcapsular liver masses that probably represent metastases. 2. Interval right upper lobectomy. Signal abnormality and abnormal enhancement anterolateral aspect of right lower chest wall could represent postoperative change, but correlation with surgical approach recommended in order to rule out chest wall metastases. 3. There are new nodular opacities in both lungs.   2024 PET-PET-2024  1. Non-avid postsurgical changes in the right upper lobe. New ground glass opacities in both lung fields as detailed above with mild activity since most recent CT in 2024. These may represent inflammatory process; however, malignant process is not excluded. Follow-up CT chest in 2-4 weeks recommended. 2. Smaller extent of increased activity in the posterior sulcus of the right lower lobe and less extensive pleuro parenchymal and atelectatic changes in the left lower lobe with improvement in extent and FDG activity. These findings suggest partial response to recent systemic therapy. 3. FDG avid hypodense lesion in hepatic segment 6 with photopenic necrotic component and peripheral rind of intense activity compatible with persistent metastatic process; likely improvement of other subcapsular lesions in segment 7 and segment 6/7. Findings may reflect partial response to therapy. 4. Interval resolution of foci of increased activity in the left anterior abdominal wall, left paracolic gutter, left retroperitoneal and left pelvic sidewall.  2024- Pt reports feeling well, has gained 1 lbs. Stoma site looks viable with formed stool. Denies etoh use in the past month.   2025- Pt reports to the office today feeling well. Denies abdominal pain, n/v, diarrhea, blood in the stool, tolerating diet, and denies weight loss in the last 2 months. No generalized jaundice noted or acholic stools. Pt's stoma noted to be pink in color with soft stool. Denies ETOH use in the past 3 months.   2025- Pt reported feeling well. Advised to repeat CT scan as discuss during [facility] TB meeting.   2025 Reports feeling well. Colostomy has been functioning well. Denies abdominal pain, n/v, has been tolerating diet.   2025 Pt reports to the office feeling well today. Denies nausea, vomiting, diarrhea, hematochezia or steatorrhea, and unintentional weight loss at this time. Hypertensive today, denies headaches or dizziness. Pt reports in the AM his BP may be elevated. Will f/u with Dr. Martins.   PMHX: none PSH: Curry's 2023 Social: Quit all substance usage but prior to sx in : 1 pack cigs/day; bottle of wine per day, no illicit drug use Med: Senna FHx: unsure ECO  Unemployed PCP: in search for one Med/onc: Dr. Martins Thoracic: Dr. Ram - lung lesion

## 2025-05-22 NOTE — ASSESSMENT
[FreeTextEntry1] : Mr. 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 Final pathology [id number] LNs 1/17 positive LNs, negative PNI, positive LVI, negative margins. BRAF and MMR intact. Presents today for potential liver surgical options.  s/p Rt VATS, RUL wedge rxn, RUL completion lobectomy, MLND, pneumolysis 09/27/2024, Path-Adenocarcinoma, acinar type predominant.  [id number], Stg IA3.   10/30/2024 MR Abd- 1. There is a 3.8 cm metastasis in segment VI of the liver with probable extracapsular extension. There are a few additional subcapsular liver masses that probably represent metastases. 2. Interval right upper lobectomy. Signal abnormality and abnormal enhancement anterolateral aspect of right lower chest wall could represent postoperative change, but correlation with surgical approach recommended in order to rule out chest wall metastases. 3. There are new nodular opacities in both lungs.   11/01/2024 PET-PET-11/01/2024  1. Non-avid postsurgical changes in the right upper lobe. New ground glass opacities in both lung fields as detailed above with mild activity since most recent CT in 8/2024. These may represent inflammatory process; however, malignant process is not excluded. Follow-up CT chest in 2-4 weeks recommended. 2. Smaller extent of increased activity in the posterior sulcus of the right lower lobe and less extensive pleuro parenchymal and atelectatic changes in the left lower lobe with improvement in extent and FDG activity. These findings suggest partial response to recent systemic therapy. 3. FDG avid hypodense lesion in hepatic segment 6 with photopenic necrotic component and peripheral rind of intense activity compatible with persistent metastatic process; likely improvement of other subcapsular lesions in segment 7 and segment 6/7. Findings may reflect partial response to therapy. 4. Interval resolution of foci of increased activity in the left anterior abdominal wall, left paracolic gutter, left retroperitoneal and left pelvic sidewall.  CT Chest 12/05/2025- 1. Since 11/4/2024, there is a new area of consolidation with an "atoll sign "appearance in the left lower lobe. This finding, in conjunction with the peripherally located groundglass nodular opacities bilaterally, is suspicious for organizing pneumonia. The possibility that one or more of these lesions is on the spectrum of adenocarcinoma cannot be excluded. Clinical correlation and continued follow-up recommended. 2. No change small right pleural effusion. 3. A liver metastasis is partially visualized.  CEA 11/2024 = 117 > 142, > 184,  2/18/2025- 155  CT A/P 1/2025 w/ stable liver mets. D/t peripheral neuropathy, we held FOLFOX, continue w/ STEPHANIE for maintenance, and give maintenance Xeloda 1500mg BID 1 week on 1 week off (flat dose) starting 2/2025 01/02/2025 CT AP (NW)- Metastatic lesion in segment 5/6 (2:55) 4.8 x3.1 cm previously 4.6 x 2.5 cm remeasured at MRI 10/30/2024. This corresponds to the hypermetabolic focus at recent PET CT. Capsular implants along the posterior right hepatic lobe correspond with hypermetabolic activity at recent PET CT and are without significant change.  03/28/2025- CT AP (NW)- Inferior right hepatic metastasis as well as multiple right hepatic subserosal implant involving the right hepatic flexure, similar as prior exam.   05/14/2025-Liver, core biopsies -   Metastatic adenocarcinoma in hepatic parenchyma  PLAN:  -Discussed liver bx results  -Continue treatment with Dr. Martins  -Discussed transition of care plan to covering provider with patient  I have discussed the diagnosis, therapeutic plan and options with the patient at length. Patient expressed verbal understanding to proceed with proposed plan. All questions answered.

## 2025-05-22 NOTE — CONSULT LETTER
[Dear  ___] : Dear  [unfilled], [Courtesy Letter:] : I had the pleasure of seeing your patient, [unfilled], in my office today. [Please see my note below.] : Please see my note below. [Consult Closing:] : Thank you very much for allowing me to participate in the care of this patient.  If you have any questions, please do not hesitate to contact me. [Sincerely,] : Sincerely, [DrGasper  ___] : Dr. CATHERINE [DrGasper ___] : Dr. CATHERINE [FreeTextEntry3] : Yuan Dent MD, RYAN, FACS Director of Surgical Oncology- [facility] , Department of Surgery [facility] at [place] [id number] Ph: [phone] Cell: [phone]

## 2025-05-22 NOTE — PHYSICAL EXAM
[Normal] : supple, no neck mass and thyroid not enlarged [Normal Neck Lymph Nodes] : normal neck lymph nodes  [Normal Supraclavicular Lymph Nodes] : normal supraclavicular lymph nodes [Normal Groin Lymph Nodes] : normal groin lymph nodes [Normal Axillary Lymph Nodes] : normal axillary lymph nodes [Normal] : oriented to person, place and time, with appropriate affect [FreeTextEntry1] :   COVID -19 precautions as per [facility] policy was universally followed. [de-identified] : colostomy in place with stool.

## 2025-05-22 NOTE — HISTORY OF PRESENT ILLNESS
[de-identified] : Chief Complaint: Pt presents for potential surgical resection of the hepatic metastasis at right hepatic lobe  HPI: Mr. 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 Final pathology [id number] LNs  positive LNs, negative PNI, positive LVI, negative margins. BRAF and MMR intact. Ostomy function intact. Currently receiving chemo 3/6 cycle.  Referred by Dr. Ramon   3/29/24 CTA LIVER: Ill-defined hypoattenuating lesion measuring 3.8 x 2.3 cm in the right lobe and a more posterior lesion along the surface of the right hepatic lobe measuring approximately 4.1 x 1.1 cm.  24: Patient reports feeling well today. Denies fever, chills, N/V/D, unintentional weight loss. Ostomy intact. Has hard stools but on stool softener now. Currently on 3/6 cycle (3month course) chemo. Reports IR drain was removed ~3weeks ago.   PET/CT  Multiple foci of intensely increased uptake at the lung bases without clear low-dose CT correlate, suspicious for malignant involvement. Suspected LEFT retroperitoneal and LEFT pelvic sidewall nodes, peritoneal nodule LEFT hemiabdomen, and suspected peritoneal implant LEFT paracolic region, suspicious for metastatic disease. Small LEFT pleural effusion with mild uptake, indeterminate for malignancy.  24: Patient reports hold on chemo to complete Lung bx. Due to restart chemotherapy in July. No new complaints. Denies fever, chills, N/V/D, unintentional weight loss.  s/p Rt VATS, RUL wedge rxn, RUL completion lobectomy, MLND, pneumolysis 2024, Path-Adenocarcinoma, acinar type predominant. Last Avastin 2024 with Dr. Mcdonald. 10/22/2024- Pt reports feeling well. Tolerating diet, has gain a couple of pounds. Denies nausea, vomiting, diarrhea, hematochezia or steatorrhea, and unintentional weight loss at this time. Ostomy site is pink and functioning with brown stool. Wife concerned patient has been having 2 beers daily.   10/30/2024 MR Abd- 1. There is a 3.8 cm metastasis in segment VI of the liver with probable extracapsular extension. There are a few additional subcapsular liver masses that probably represent metastases. 2. Interval right upper lobectomy. Signal abnormality and abnormal enhancement anterolateral aspect of right lower chest wall could represent postoperative change, but correlation with surgical approach recommended in order to rule out chest wall metastases. 3. There are new nodular opacities in both lungs.   2024 PET-PET-2024  1. Non-avid postsurgical changes in the right upper lobe. New ground glass opacities in both lung fields as detailed above with mild activity since most recent CT in 2024. These may represent inflammatory process; however, malignant process is not excluded. Follow-up CT chest in 2-4 weeks recommended. 2. Smaller extent of increased activity in the posterior sulcus of the right lower lobe and less extensive pleuro parenchymal and atelectatic changes in the left lower lobe with improvement in extent and FDG activity. These findings suggest partial response to recent systemic therapy. 3. FDG avid hypodense lesion in hepatic segment 6 with photopenic necrotic component and peripheral rind of intense activity compatible with persistent metastatic process; likely improvement of other subcapsular lesions in segment 7 and segment 6/7. Findings may reflect partial response to therapy. 4. Interval resolution of foci of increased activity in the left anterior abdominal wall, left paracolic gutter, left retroperitoneal and left pelvic sidewall.  2024- Pt reports feeling well, has gained 1 lbs. Stoma site looks viable with formed stool. Denies etoh use in the past month.   2025- Pt reports to the office today feeling well. Denies abdominal pain, n/v, diarrhea, blood in the stool, tolerating diet, and denies weight loss in the last 2 months. No generalized jaundice noted or acholic stools. Pt's stoma noted to be pink in color with soft stool. Denies ETOH use in the past 3 months.   2025- Pt reported feeling well. Advised to repeat CT scan as discuss during [facility] TB meeting.   2025 Reports feeling well. Colostomy has been functioning well. Denies abdominal pain, n/v, has been tolerating diet.   2025 Pt reports to the office feeling well today. Denies nausea, vomiting, diarrhea, hematochezia or steatorrhea, and unintentional weight loss at this time. Hypertensive today, denies headaches or dizziness. Pt reports in the AM his BP may be elevated. Will f/u with Dr. Martins.   PMHX: none PSH: Curry's 2023 Social: Quit all substance usage but prior to sx in : 1 pack cigs/day; bottle of wine per day, no illicit drug use Med: Senna FHx: unsure ECO  Unemployed PCP: in search for one Med/onc: Dr. Martins Thoracic: Dr. Ram - lung lesion

## 2025-05-22 NOTE — HEALTH RISK ASSESSMENT
[Good] : ~his/her~ current health as good [Very Good] : ~his/her~  mood as very good [Never (0 pts)] : Never (0 points) [No] : In the past 12 months have you used drugs other than those required for medical reasons? No [No falls in past year] : Patient reported no falls in the past year [Little interest or pleasure doing things] : 1) Little interest or pleasure doing things [Feeling down, depressed, or hopeless] : 2) Feeling down, depressed, or hopeless [0] : 2) Feeling down, depressed, or hopeless: Not at all (0) [PHQ-2 Negative - No further assessment needed] : PHQ-2 Negative - No further assessment needed [NO] : No [HIV test declined] : HIV test declined [Hepatitis C test declined] : Hepatitis C test declined [With Family] : lives with family [Unemployed] : unemployed [Single] : single [Sexually Active] : sexually active [Feels Safe at Home] : Feels safe at home [Reports normal functional visual acuity (ie: able to read med bottle)] : Reports normal functional visual acuity [Smoke Detector] : smoke detector [Carbon Monoxide Detector] : carbon monoxide detector [Safety elements used in home] : safety elements used in home [Seat Belt] :  uses seat belt [1 or 2 (0 pts)] : 1 or 2 (0 points) [Former] : Former [20 or more] : 20 or more [< 15 Years] : < 15 Years [de-identified] : no [de-identified] : no [Audit-CScore] : 0 [de-identified] : pt is active  [de-identified] : good diet  [[id number]] : 0 [Change in mental status noted] : No change in mental status noted [Language] : denies difficulty with language [Behavior] : denies difficulty with behavior [Learning/Retaining New Information] : denies difficulty learning/retaining new information [Handling Complex Tasks] : denies difficulty handling complex tasks [Reasoning] : denies difficulty with reasoning [Spatial Ability and Orientation] : denies difficulty with spatial ability and orientation [High Risk Behavior] : no high risk behavior [Fully functional (bathing, dressing, toileting, transferring, walking, feeding)] : Fully functional (bathing, dressing, toileting, transferring, walking, feeding) [Fully functional (using the telephone, shopping, preparing meals, housekeeping, doing laundry, using] : Fully functional and needs no help or supervision to perform IADLs (using the telephone, shopping, preparing meals, housekeeping, doing laundry, using transportation, managing medications and managing finances) [Reports changes in hearing] : Reports no changes in hearing [Reports changes in vision] : Reports no changes in vision [Reports changes in dental health] : Reports no changes in dental health [Sunscreen] : does not use sunscreen [Travel to Developing Areas] : does not  travel to developing areas [TB Exposure] : is not being exposed to tuberculosis [Caregiver Concerns] : does not have caregiver concerns [ColonoscopyComments] : pt never had one  [Patient/Caregiver unclear of wishes] : , patient/caregiver unclear of wishes [I will adhere to the patient's wishes.] : I will adhere to the patient's wishes. [Time Spent: ___ minutes] : Time Spent: [unfilled] minutes [AdvancecareDate] : 05/22/2025

## 2025-05-22 NOTE — CONSULT LETTER
[Dear  ___] : Dear  [unfilled], [Courtesy Letter:] : I had the pleasure of seeing your patient, [unfilled], in my office today. [Please see my note below.] : Please see my note below. [Consult Closing:] : Thank you very much for allowing me to participate in the care of this patient.  If you have any questions, please do not hesitate to contact me. [Sincerely,] : Sincerely, [DrGasper  ___] : Dr. CATHERINE [DrGasper ___] : Dr. CATHERINE [FreeTextEntry3] : Yuan Dent MD, RYAN, FACS Director of Surgical Oncology- [facility] , Department of Surgery [facility] at [place] [id number] Ph: [phone] Cell: [phone]

## 2025-05-22 NOTE — CONSULT LETTER
[Dear  ___] : Dear  [unfilled], [Courtesy Letter:] : I had the pleasure of seeing your patient, [unfilled], in my office today. [Please see my note below.] : Please see my note below. [Consult Closing:] : Thank you very much for allowing me to participate in the care of this patient.  If you have any questions, please do not hesitate to contact me. [Sincerely,] : Sincerely, [DrGasper  ___] : Dr. CATHERINE [DrGasper ___] : Dr. CATHERINE [FreeTextEntry3] : Yuan Dent MD, RYAN, FACS Director of Surgical Oncology- [facility] , Department of Surgery [facility] at [place] [id number] Ph: [phone] Cell: [phone]

## 2025-05-22 NOTE — ASSESSMENT
[FreeTextEntry1] : Mr. 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 Final pathology [id number] LNs 1/17 positive LNs, negative PNI, positive LVI, negative margins. BRAF and MMR intact. Presents today for potential liver surgical options.  s/p Rt VATS, RUL wedge rxn, RUL completion lobectomy, MLND, pneumolysis 09/27/2024, Path-Adenocarcinoma, acinar type predominant.  [id number], Stg IA3.   10/30/2024 MR Abd- 1. There is a 3.8 cm metastasis in segment VI of the liver with probable extracapsular extension. There are a few additional subcapsular liver masses that probably represent metastases. 2. Interval right upper lobectomy. Signal abnormality and abnormal enhancement anterolateral aspect of right lower chest wall could represent postoperative change, but correlation with surgical approach recommended in order to rule out chest wall metastases. 3. There are new nodular opacities in both lungs.   11/01/2024 PET-PET-11/01/2024  1. Non-avid postsurgical changes in the right upper lobe. New ground glass opacities in both lung fields as detailed above with mild activity since most recent CT in 8/2024. These may represent inflammatory process; however, malignant process is not excluded. Follow-up CT chest in 2-4 weeks recommended. 2. Smaller extent of increased activity in the posterior sulcus of the right lower lobe and less extensive pleuro parenchymal and atelectatic changes in the left lower lobe with improvement in extent and FDG activity. These findings suggest partial response to recent systemic therapy. 3. FDG avid hypodense lesion in hepatic segment 6 with photopenic necrotic component and peripheral rind of intense activity compatible with persistent metastatic process; likely improvement of other subcapsular lesions in segment 7 and segment 6/7. Findings may reflect partial response to therapy. 4. Interval resolution of foci of increased activity in the left anterior abdominal wall, left paracolic gutter, left retroperitoneal and left pelvic sidewall.  CT Chest 12/05/2025- 1. Since 11/4/2024, there is a new area of consolidation with an "atoll sign "appearance in the left lower lobe. This finding, in conjunction with the peripherally located groundglass nodular opacities bilaterally, is suspicious for organizing pneumonia. The possibility that one or more of these lesions is on the spectrum of adenocarcinoma cannot be excluded. Clinical correlation and continued follow-up recommended. 2. No change small right pleural effusion. 3. A liver metastasis is partially visualized.  CEA 11/2024 = 117 > 142, > 184,  2/18/2025- 155  CT A/P 1/2025 w/ stable liver mets. D/t peripheral neuropathy, we held FOLFOX, continue w/ STEPHANIE for maintenance, and give maintenance Xeloda 1500mg BID 1 week on 1 week off (flat dose) starting 2/2025 01/02/2025 CT AP (NW)- Metastatic lesion in segment 5/6 (2:55) 4.8 x3.1 cm previously 4.6 x 2.5 cm remeasured at MRI 10/30/2024. This corresponds to the hypermetabolic focus at recent PET CT. Capsular implants along the posterior right hepatic lobe correspond with hypermetabolic activity at recent PET CT and are without significant change.  03/28/2025- CT AP (NW)- Inferior right hepatic metastasis as well as multiple right hepatic subserosal implant involving the right hepatic flexure, similar as prior exam.   05/14/2025-Liver, core biopsies -   Metastatic adenocarcinoma in hepatic parenchyma  PLAN:  -Discussed liver bx results  -Continue treatment with Dr. Martins  -Discussed transition of care plan to covering provider with patient  I have discussed the diagnosis, therapeutic plan and options with the patient at length. Patient expressed verbal understanding to proceed with proposed plan. All questions answered.

## 2025-06-06 NOTE — HISTORY OF PRESENT ILLNESS
[Spouse] : spouse [FreeTextEntry1] : pt states f/u is for blood pressure management  [de-identified] : 61 M former smoker with metastatic rectal cancer with rectosigmoid perforation s/p open Curry's procedure now on chemotherapy presenting today for a follow-up visit.  He has been compliant with his blood pressure medications and the home blood pressure logs have been reviewed today and are within normal limits.  He feels well and denies any concerns

## 2025-06-06 NOTE — HEALTH RISK ASSESSMENT
[No] : In the past 12 months have you used drugs other than those required for medical reasons? No [No falls in past year] : Patient reported no falls in the past year [Little interest or pleasure doing things] : 1) Little interest or pleasure doing things [Feeling down, depressed, or hopeless] : 2) Feeling down, depressed, or hopeless [0] : 2) Feeling down, depressed, or hopeless: Not at all (0) [PHQ-2 Negative - No further assessment needed] : PHQ-2 Negative - No further assessment needed [Never] : Never [de-identified] : no [de-identified] : no [de-identified] : walking  [de-identified] : good  [[id number]] : 0